# Patient Record
Sex: FEMALE | Race: WHITE | NOT HISPANIC OR LATINO | Employment: FULL TIME | URBAN - METROPOLITAN AREA
[De-identification: names, ages, dates, MRNs, and addresses within clinical notes are randomized per-mention and may not be internally consistent; named-entity substitution may affect disease eponyms.]

---
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
## 2019-02-27 ENCOUNTER — LAB VISIT (OUTPATIENT)
Dept: LAB | Facility: HOSPITAL | Age: 19
End: 2019-02-27
Attending: PSYCHIATRY & NEUROLOGY
Payer: COMMERCIAL

## 2019-02-27 ENCOUNTER — OFFICE VISIT (OUTPATIENT)
Dept: NEUROLOGY | Facility: CLINIC | Age: 19
End: 2019-02-27
Payer: COMMERCIAL

## 2019-02-27 VITALS
HEART RATE: 66 BPM | BODY MASS INDEX: 24.56 KG/M2 | DIASTOLIC BLOOD PRESSURE: 58 MMHG | WEIGHT: 133.5 LBS | RESPIRATION RATE: 20 BRPM | SYSTOLIC BLOOD PRESSURE: 120 MMHG | HEIGHT: 62 IN

## 2019-02-27 DIAGNOSIS — R25.1 TREMOR: Primary | ICD-10-CM

## 2019-02-27 DIAGNOSIS — R25.1 TREMOR: ICD-10-CM

## 2019-02-27 DIAGNOSIS — R20.0 BILATERAL HAND NUMBNESS: ICD-10-CM

## 2019-02-27 DIAGNOSIS — M25.531 PAIN IN BOTH WRISTS: ICD-10-CM

## 2019-02-27 DIAGNOSIS — M25.532 PAIN IN BOTH WRISTS: ICD-10-CM

## 2019-02-27 DIAGNOSIS — F41.9 ANXIETY: ICD-10-CM

## 2019-02-27 LAB
ALBUMIN SERPL BCP-MCNC: 4.3 G/DL
ALP SERPL-CCNC: 66 U/L
ALT SERPL W/O P-5'-P-CCNC: 13 U/L
ANION GAP SERPL CALC-SCNC: 6 MMOL/L
AST SERPL-CCNC: 16 U/L
BASOPHILS # BLD AUTO: 0.06 K/UL
BASOPHILS NFR BLD: 0.7 %
BILIRUB SERPL-MCNC: 0.5 MG/DL
BUN SERPL-MCNC: 9 MG/DL
CALCIUM SERPL-MCNC: 10 MG/DL
CERULOPLASMIN SERPL-MCNC: 20 MG/DL
CHLORIDE SERPL-SCNC: 104 MMOL/L
CO2 SERPL-SCNC: 27 MMOL/L
CREAT SERPL-MCNC: 0.7 MG/DL
DIFFERENTIAL METHOD: ABNORMAL
EOSINOPHIL # BLD AUTO: 0.1 K/UL
EOSINOPHIL NFR BLD: 1.3 %
ERYTHROCYTE [DISTWIDTH] IN BLOOD BY AUTOMATED COUNT: 11.5 %
EST. GFR  (AFRICAN AMERICAN): >60 ML/MIN/1.73 M^2
EST. GFR  (NON AFRICAN AMERICAN): >60 ML/MIN/1.73 M^2
GLUCOSE SERPL-MCNC: 89 MG/DL
HCT VFR BLD AUTO: 40.1 %
HGB BLD-MCNC: 13.6 G/DL
IMM GRANULOCYTES # BLD AUTO: 0.02 K/UL
IMM GRANULOCYTES NFR BLD AUTO: 0.2 %
LYMPHOCYTES # BLD AUTO: 1.8 K/UL
LYMPHOCYTES NFR BLD: 20.1 %
MCH RBC QN AUTO: 33.8 PG
MCHC RBC AUTO-ENTMCNC: 33.9 G/DL
MCV RBC AUTO: 100 FL
MONOCYTES # BLD AUTO: 0.6 K/UL
MONOCYTES NFR BLD: 6.7 %
NEUTROPHILS # BLD AUTO: 6.4 K/UL
NEUTROPHILS NFR BLD: 71 %
NRBC BLD-RTO: 0 /100 WBC
PLATELET # BLD AUTO: 299 K/UL
PMV BLD AUTO: 11.2 FL
POTASSIUM SERPL-SCNC: 4.1 MMOL/L
PROT SERPL-MCNC: 7.3 G/DL
RBC # BLD AUTO: 4.02 M/UL
SODIUM SERPL-SCNC: 137 MMOL/L
T3FREE SERPL-MCNC: 2.6 PG/ML
T4 FREE SERPL-MCNC: 0.92 NG/DL
TSH SERPL DL<=0.005 MIU/L-ACNC: 0.74 UIU/ML
WBC # BLD AUTO: 9.04 K/UL

## 2019-02-27 PROCEDURE — 3008F BODY MASS INDEX DOCD: CPT | Mod: CPTII,S$GLB,, | Performed by: PSYCHIATRY & NEUROLOGY

## 2019-02-27 PROCEDURE — 99205 OFFICE O/P NEW HI 60 MIN: CPT | Mod: S$GLB,,, | Performed by: PSYCHIATRY & NEUROLOGY

## 2019-02-27 PROCEDURE — 80053 COMPREHEN METABOLIC PANEL: CPT

## 2019-02-27 PROCEDURE — 99205 PR OFFICE/OUTPT VISIT, NEW, LEVL V, 60-74 MIN: ICD-10-PCS | Mod: S$GLB,,, | Performed by: PSYCHIATRY & NEUROLOGY

## 2019-02-27 PROCEDURE — 99999 PR PBB SHADOW E&M-EST. PATIENT-LVL III: ICD-10-PCS | Mod: PBBFAC,,, | Performed by: PSYCHIATRY & NEUROLOGY

## 2019-02-27 PROCEDURE — 84481 FREE ASSAY (FT-3): CPT

## 2019-02-27 PROCEDURE — 84439 ASSAY OF FREE THYROXINE: CPT

## 2019-02-27 PROCEDURE — 82390 ASSAY OF CERULOPLASMIN: CPT

## 2019-02-27 PROCEDURE — 99999 PR PBB SHADOW E&M-EST. PATIENT-LVL III: CPT | Mod: PBBFAC,,, | Performed by: PSYCHIATRY & NEUROLOGY

## 2019-02-27 PROCEDURE — 36415 COLL VENOUS BLD VENIPUNCTURE: CPT | Mod: PO

## 2019-02-27 PROCEDURE — 84443 ASSAY THYROID STIM HORMONE: CPT

## 2019-02-27 PROCEDURE — 3008F PR BODY MASS INDEX (BMI) DOCUMENTED: ICD-10-PCS | Mod: CPTII,S$GLB,, | Performed by: PSYCHIATRY & NEUROLOGY

## 2019-02-27 PROCEDURE — 85025 COMPLETE CBC W/AUTO DIFF WBC: CPT

## 2019-02-27 NOTE — PATIENT INSTRUCTIONS
- Wear wrist splints on both wrists nightly for 6-8 weeks  - EMG/nerve conduction study for diagnosis  - Bloodwork to look for other causes of the tremor

## 2019-02-27 NOTE — PROGRESS NOTES
Date: 2/27/2019    Patient ID: Paz Szymanski is a 18 y.o. female.    Referring Provider:  Self, Aaareferral    Chief Complaint: Numbness (bob hands.  Right hand worse); Tremors; and bruning sensation      History of Present Illness:  Ms. Szymanski is a 18 y.o. female who presents referred by Self, Aaareferral today for evaluation of numbness/tingling/burning sensation and tremors. The patient was accompanied by her mother who also contributed to the following history.     She has noticed tremor in her hands for 3 years but it progressively getting worse. This is with activity and not at rest. The right hand is worse but it is in the left hand as well. Her right leg and foot will shake sometimes as well. Sometimes she has jerking of the arm or help.     She has been having shooting pains and burning sensation in the wrists bilateral. That has been going on a long time. This usually happens with activity or use of the hands. She wakes up in the middle of the night with the pain. Shaking the hand improves things. She is constantly cracking her wrists as well because that seems to help. Sometimes it feels numb but in general it is a pain and burning sensation. When she is writing, she has aching pain in her hands.     No numbness in the legs. Her mother stated there is no family history of tremor other than some in her father when he was drinking or smoking. She has not been hungry lately. She has a gluten allergy and lost 60 pounds and gained some back. She has ringing in the ears in both ears. She has occasional cough. She hears herself sometimes wheeze. Her heart races or feels like palpitations sometimes. She feels this is connected to anxiety. She feels like it is well controlled off medication. She wakes up nightly having to change her shirt. This has been going on for a couple of months. She gets very cold or hot and has temperature swings. She has muscle and joint pains.     Review of Systems:   Comprehensive  "review of systems is negative except as mentioned in the HPI    Allergies:  Review of patient's allergies indicates:   Allergen Reactions    Penicillins        Current Medications:  No current outpatient medications on file.     No current facility-administered medications for this visit.        Past Medical History:  Past Medical History:   Diagnosis Date    Thyroid nodule        Past Surgical History:  History reviewed. No pertinent surgical history.    Family History:  family history includes No Known Problems in her mother; Tremor in her father.    Social History:   reports that  has never smoked. she has never used smokeless tobacco. She reports that she drinks alcohol. She reports that she does not use drugs.    Physical Exam:  Vitals:    02/27/19 1107   BP: (!) 120/58   Pulse: 66   Resp: 20   Weight: 60.6 kg (133 lb 8 oz)   Height: 5' 2" (1.575 m)   PainSc: 0-No pain     Body mass index is 24.42 kg/m².  General: Well developed, well nourished.  No acute distress.  Eyes: no ocular hemorrhages  Musculoskeletal: No obvious joint deformities, moves all extremities well.  Peripheral vascular: No edema noted    Neurological Exam:  Mental status: Awake, alert, and oriented to person, place, and time. Recent and remote memory appear to be intact. Attention, concentration, and fund of knowledge regarding recent events normal.   Speech/Language: No dysarthria or aphasia on conversation.   Cranial nerves: Visual fields full. Pupils equal round and reactive to light, extraocular movements intact, facial strength and sensation intact bilaterally, palate and tongue midline, hearing grossly intact bilaterally. Shoulder shrug normal bilaterally.   Motor: 5 out of 5 strength throughout the upper and lower extremities bilaterally. Normal bulk and tone.   Sensation: Intact to light touch, pinprick, and vibration bilaterally.  DTR: 2+ at the knees and biceps bilaterally.  Coordination: Finger-nose-finger testing and rapid " alternating movements normal bilaterally. Bilateral right > left postural and action tremor.   Gait: Normal gait    Data:  I have personally reviewed the referring provider's notes, labs, & imaging made available to me today.       Labs:  CBC:   Lab Results   Component Value Date    WBC 11.53 02/11/2015    HGB 13.4 02/11/2015    HCT 38.8 02/11/2015     02/11/2015    MCV 96 02/11/2015    RDW 12.1 02/11/2015     BMP:   Lab Results   Component Value Date     01/10/2017    K 4.4 01/10/2017     01/10/2017    CO2 24 01/10/2017    BUN 13 01/10/2017    CREATININE 0.8 01/10/2017    GLU 94 01/10/2017    CALCIUM 9.1 01/10/2017     LFTS;   Lab Results   Component Value Date    PROT 7.1 01/10/2017    ALBUMIN 3.8 01/10/2017    BILITOT 0.2 01/10/2017    AST 24 01/10/2017    ALKPHOS 72 01/10/2017    ALT 24 01/10/2017     COAGS: No results found for: INR, PROTIME, PTT  FLP:   Lab Results   Component Value Date    CHOL 193 01/10/2017    HDL 43 01/10/2017    LDLCALC 136.4 01/10/2017    TRIG 68 01/10/2017    CHOLHDL 22.3 01/10/2017         Imaging:  Thyroid US showed nodules but none large enough for biopsy.       Assessment and Plan:  Ms. Szymanski is a 18 y.o. female referred to me by Self, Aaareferral for evaluation of tremor and hand numbness/pain. We will obtain labs to rule out secondary causes of tremor. This appears to be essential tremor or enhanced physiologic tremor. I discussed lifestyle modifications to help. Anxiety can make this worse but she feels her anxiety is well controlled. We discussed propranolol but she is not interested.     For the hand numbness and pain, this sounds like CTS. We will obtain EMG and I advised her to wear wrist splints. If the wrist splints do not help, we can consider ortho referral for injections or CTS surgery discussion. I will see her back after EMG for followup.     Tremor  -     TSH; Future; Expected date: 02/27/2019  -     T4, FREE; Future; Expected date: 02/27/2019  -      CBC auto differential; Future; Expected date: 02/27/2019  -     Comprehensive metabolic panel; Future; Expected date: 02/27/2019  -     EMG W/ ULTRASOUND AND NERVE CONDUCTION TEST 2 Extremities; Future  -     T3, FREE; Future; Expected date: 02/27/2019  -     Ceruloplasmin; Future; Expected date: 02/27/2019    Bilateral hand numbness  -     TSH; Future; Expected date: 02/27/2019  -     T4, FREE; Future; Expected date: 02/27/2019  -     CBC auto differential; Future; Expected date: 02/27/2019  -     Comprehensive metabolic panel; Future; Expected date: 02/27/2019  -     EMG W/ ULTRASOUND AND NERVE CONDUCTION TEST 2 Extremities; Future  -     T3, FREE; Future; Expected date: 02/27/2019  -     Ceruloplasmin; Future; Expected date: 02/27/2019    Anxiety    Pain in both wrists

## 2019-03-04 ENCOUNTER — TELEPHONE (OUTPATIENT)
Dept: NEUROLOGY | Facility: CLINIC | Age: 19
End: 2019-03-04

## 2019-03-04 NOTE — TELEPHONE ENCOUNTER
----- Message from Ren Everett sent at 3/2/2019 11:24 AM CST -----  Contact: self   Patient need to speak with a nurse regarding her brace, she is in a lot of pain and want to know if she should continue wearing at night. Please call back at [phone] (home)

## 2019-03-06 NOTE — TELEPHONE ENCOUNTER
Spoke with patient. She is wearing a [employer] nighttime brace; wearing on both sides since O.V. Stated that the other day was severe but they are feeling a lot better now; thinks she might have had it on too tight.

## 2019-03-12 ENCOUNTER — TELEPHONE (OUTPATIENT)
Dept: NEUROLOGY | Facility: CLINIC | Age: 19
End: 2019-03-12

## 2019-03-12 NOTE — LETTER
March 12, 2019               [facility]  Neurology  [address]  [place] [address]  Phone: [phone]  Fax: [phone]   March 12, 2019     Patient: Paz Szymanski   YOB: 2000   Date of Visit: 3/12/2019       To Whom it May Concern:    Paz Szymanski was seen in my clinic on 3/12/2019. She is currently having difficulty writing due to hand pain so please excuse her from writing until these difficulties improve.     If you have any questions or concerns, please don't hesitate to call.    Sincerely,         Linda Olmos MD

## 2019-03-12 NOTE — TELEPHONE ENCOUNTER
"Pt arrived to clinic without appt requesting drs note for carpal tunnel; pt not wearing brace on right hand; pt is reporting that she got angry at someone "the other day" and punched a wall with right hand. Right hand red and swollen. Dr. Olmos did write a note for today for the pain; however, pt instructed to go to walk in ortho clinic across the street, ED for eval or PCP to assess right hand; pt states "I have no time for this and you just wasted my whole day because I needed this for longer"; notified pt that Dr. Olmos is a neurology specialist and not ortho; pt started crying and walked out.    "

## 2019-03-12 NOTE — TELEPHONE ENCOUNTER
----- Message from Allie Monson sent at 3/12/2019 10:53 AM CDT -----  Contact: self   Patient want to know if you can write a note for her school saying she can't write and the pain she having in her hand patient will  today, any questions please call back at [phone] (home)

## 2019-04-22 ENCOUNTER — PROCEDURE VISIT (OUTPATIENT)
Dept: NEUROLOGY | Facility: CLINIC | Age: 19
End: 2019-04-22
Payer: COMMERCIAL

## 2019-04-22 DIAGNOSIS — R20.0 BILATERAL HAND NUMBNESS: ICD-10-CM

## 2019-04-22 DIAGNOSIS — R25.1 TREMOR: ICD-10-CM

## 2019-04-22 PROCEDURE — 95912 NRV CNDJ TEST 11-12 STUDIES: CPT | Mod: S$GLB,,, | Performed by: PSYCHIATRY & NEUROLOGY

## 2019-04-22 PROCEDURE — 95885 MUSC TST DONE W/NERV TST LIM: CPT | Mod: 59,S$GLB,, | Performed by: PSYCHIATRY & NEUROLOGY

## 2019-04-22 PROCEDURE — 95886 MUSC TEST DONE W/N TEST COMP: CPT | Mod: S$GLB,,, | Performed by: PSYCHIATRY & NEUROLOGY

## 2019-04-22 PROCEDURE — 95912 PR NERVE CONDUCTION STUDY; 11 -12 STUDIES: ICD-10-PCS | Mod: S$GLB,,, | Performed by: PSYCHIATRY & NEUROLOGY

## 2019-04-22 PROCEDURE — 95885 PR MUSC TST DONE W/NERV TST LIM: ICD-10-PCS | Mod: 59,S$GLB,, | Performed by: PSYCHIATRY & NEUROLOGY

## 2019-04-22 PROCEDURE — 95886 PR EMG COMPLETE, W/ NERVE CONDUCTION STUDIES, 5+ MUSCLES: ICD-10-PCS | Mod: S$GLB,,, | Performed by: PSYCHIATRY & NEUROLOGY

## 2019-04-22 NOTE — PROCEDURES
[facility]   [facility]  [address] [place] [address]  [phone]             Full Name: Paz Szymanski Gender: Female  Patient ID: [id number] YOB: 2000      Visit Date: 4/22/2019 13:22  Age: 18 Years 11 Months Old  Examining Physician: Trupti Watkins D.O.   Referring Physician: Linda Olmos M.D.   Height: 5 feet 2 inch  History: Patient complains of chronic pain in her hands since childhood.  Evaluate for carpal tunnel syndrome.      Sensory NCS      Nerve / Sites Rec. Site Onset Lat Peak Lat NP Amp Segments Distance Peak Diff Velocity     ms ms µV  cm ms m/s   L Median - Digit II (Antidromic)      Wrist Dig II 1.98 2.71 50.1 Wrist - Dig II 13  66      Ref.   ?3.30 ?20.0 Ref.   ?50   R Median - Digit II (Antidromic)      Wrist Dig II 1.88 2.60 41.1 Wrist - Dig II 13  69      Ref.   ?3.30 ?20.0 Ref.   ?50   L Ulnar - Digit V (Antidromic)      Wrist Dig V 1.98 2.76 48.9 Wrist - Dig V 11  56      Ref.   ?3.00 ?18.0 Ref.   ?51   R Ulnar - Digit V (Antidromic)      Wrist Dig V 1.72 2.45 42.9 Wrist - Dig V 11  64      Ref.   ?3.00 ?18.0 Ref.   ?51   L Median - Mixed Palmar      Med - Palm Wrist 1.09 1.61 96.2 Med - Palm - Wrist 14  128      Uln - Palm Wrist 1.20 1.72 45.5 Uln - Palm - Wrist 14  117        Med - Palm - Uln - Palm  -0.10         Ref.  ?0.60    Median - Mixed Palmar      Med - Palm Wrist 1.25 1.72 120.3 Med - Palm - Wrist 14  112      Uln - Palm Wrist 1.20 1.72 76.0 Uln - Palm - Wrist 14  117        Med - Palm - Uln - Palm  0.00         Ref.  ?0.60        Motor NCS      Nerve / Sites Muscle Latency Ref. Amplitude Ref. Amp % Duration Segments Distance Lat Diff Ref. Velocity Ref.     ms ms mV mV % ms  cm ms ms m/s m/s   L Median - APB      Wrist APB 3.02 ?3.90 17.0 ?6.0 100 5.36 Wrist - APB 20          Elbow APB 6.41  16.9  99.6 5.47 Elbow - Wrist 20 3.39  59 ?51   R Median - APB      Wrist APB 2.71 ?3.90 10.3 ?6.0 100 5.31 Wrist - APB            Elbow APB 6.30  9.3  90.6 5.52 Elbow - Wrist 21 3.59  58 ?51   L Ulnar - ADM      Wrist ADM 2.34 ?3.00 13.5 ?8.0 100 4.84 Wrist - ADM           B.Elbow ADM 5.10  13.1  97.3 4.95 B.Elbow - Wrist 18 2.76  65 ?51      A.Elbow ADM 6.61  12.4  91.7 5.05 A.Elbow - B.Elbow 10 1.51  66    R Ulnar - ADM      Wrist ADM 2.19 ?3.00 14.9 ?8.0 100 5.26 Wrist - ADM           B.Elbow ADM 5.16  14.3  95.7 5.36 B.Elbow - Wrist 19 2.97  64 ?51      A.Elbow ADM 6.77  14.1  94.7 5.42 A.Elbow - B.Elbow 12 1.61  74    L Median, Ulnar - Lumbrical-Interossei      Median Wrist Lumb II 3.28  4.1  100 5.00 Median Wrist - Lumb II 10          Ulnar Wrist Lumb II 2.60  8.5  207 6.35 Ulnar Wrist - Lumb II 10               Median Wrist - Ulnar Wrist  0.68 ?0.50     R Median, Ulnar - Lumbrical-Interossei      Median Wrist Lumb II 2.55  5.6  100 4.95 Median Wrist - Lumb II 10          Ulnar Wrist Lumb II 2.34  9.7  172 4.90 Ulnar Wrist - Lumb II 10               Median Wrist - Ulnar Wrist  0.21 ?0.50         EMG Summary Table     Spontaneous Recruitment Activation Duration Amplitude Polyphasia Comment   Muscle Ins Act Fib Fasc Pattern - - - - -   L. First dorsal interosseous Normal 0 0 Normal Normal Normal Normal Normal Normal   L. Abductor pollicis brevis Normal 0 0 Normal Normal Normal Normal Normal Normal   L. Pronator teres Normal 0 0 Normal Normal Normal Normal Normal Normal   L. Biceps brachii Normal 0 0 Normal Normal Normal Normal Normal Normal   L. Triceps brachii Normal 0 0 Normal Normal Normal Normal Normal Normal   R. First dorsal interosseous Normal 0 0 Normal Normal Normal Normal Normal Normal   R. Abductor pollicis brevis Normal 0 0 Sl Dec Normal Normal Normal Normal Normal   R. Pronator teres Normal 0 0 Normal Normal Normal Normal Normal Normal   R. Biceps brachii Normal 0 0 Normal Normal Normal Normal Normal Normal   R. Triceps brachii Normal 0 0 Normal Normal Normal Normal Normal Normal         Summary:  Nerve conduction studies  were performed on both upper extremities.  Bilateral median and ulnar sensory responses were normal in amplitude and latency.  Bilateral median and ulnar motor responses were normal in amplitude, latency and velocity.  Due to the question of carpal tunnel syndrome further internal comparison studies were performed.  Bilateral median versus ulnar mixed palmar comparison studies revealed no significant difference in sensory latency between the median and ulnar nerves.  Left median versus ulnar 2nd lumbrical interosseous comparison study revealed a prolonged median motor latency as compared to the ulnar.  The right median versus ulnar 2nd lumbrical interosseous comparison study revealed no significant difference in motor latencies.  Needle EMG was performed on both upper extremities.  No active denervation was present in any muscle tested.  Slightly decreased recruitment was noted in the right abductor pollicis brevis muscle.  All other motor unit morphology and recruitment patterns were normal.    Impression:  This is an essentially normal EMG.  There is evidence that is suggestive, but not diagnostic for median mononeuropathy across the wrist (carpal tunnel syndrome) on the right or left side.  There is no evidence of peripheral neuropathy, cervical radiculopathy, plexopathy, or any other focal neuropathy on this study.      Thank you for referring to the [facility] in [place].  Please feel free to contact the clinic if you have any further questions regarding this study or report.        ____________________________  Trupti Watkins D.O.

## 2019-04-23 ENCOUNTER — TELEPHONE (OUTPATIENT)
Dept: NEUROLOGY | Facility: CLINIC | Age: 19
End: 2019-04-23

## 2019-04-23 DIAGNOSIS — R20.0 BILATERAL HAND NUMBNESS: Primary | ICD-10-CM

## 2019-04-23 DIAGNOSIS — G56.03 BILATERAL CARPAL TUNNEL SYNDROME: ICD-10-CM

## 2019-04-23 NOTE — TELEPHONE ENCOUNTER
----- Message from Yeimy [place] sent at 4/23/2019 11:56 AM CDT -----  Contact: Self  Pt is calling because she says she had an EMG and wants to inform Staff of how she's doing.     She can be reached at [phone].    Thank you.

## 2019-04-23 NOTE — TELEPHONE ENCOUNTER
Pt had an EMG yesterday that showed CTS.  The wrist splints are causing more pain.  What is the next step?

## 2019-05-02 ENCOUNTER — OFFICE VISIT (OUTPATIENT)
Dept: ORTHOPEDICS | Facility: CLINIC | Age: 19
End: 2019-05-02
Payer: COMMERCIAL

## 2019-05-02 ENCOUNTER — HOSPITAL ENCOUNTER (OUTPATIENT)
Dept: RADIOLOGY | Facility: HOSPITAL | Age: 19
Discharge: HOME | End: 2019-05-02
Attending: ORTHOPAEDIC SURGERY
Payer: COMMERCIAL

## 2019-05-02 VITALS
WEIGHT: 133.63 LBS | BODY MASS INDEX: 24.59 KG/M2 | HEIGHT: 62 IN | HEART RATE: 79 BPM | DIASTOLIC BLOOD PRESSURE: 82 MMHG | SYSTOLIC BLOOD PRESSURE: 117 MMHG

## 2019-05-02 DIAGNOSIS — M79.642 PAIN IN BOTH HANDS: ICD-10-CM

## 2019-05-02 DIAGNOSIS — M79.641 PAIN IN BOTH HANDS: ICD-10-CM

## 2019-05-02 DIAGNOSIS — R25.1 TREMOR OF BOTH HANDS: ICD-10-CM

## 2019-05-02 DIAGNOSIS — R25.1 TREMOR OF BOTH HANDS: Primary | ICD-10-CM

## 2019-05-02 DIAGNOSIS — F41.9 ANXIETY: ICD-10-CM

## 2019-05-02 PROCEDURE — 99203 OFFICE O/P NEW LOW 30 MIN: CPT | Mod: S$GLB,,, | Performed by: ORTHOPAEDIC SURGERY

## 2019-05-02 PROCEDURE — 73130 X-RAY EXAM OF HAND: CPT | Mod: TC,PO,RT

## 2019-05-02 PROCEDURE — 73130 XR HAND COMPLETE 3 VIEW RIGHT: ICD-10-PCS | Mod: 26,RT,, | Performed by: RADIOLOGY

## 2019-05-02 PROCEDURE — 3008F BODY MASS INDEX DOCD: CPT | Mod: CPTII,S$GLB,, | Performed by: ORTHOPAEDIC SURGERY

## 2019-05-02 PROCEDURE — 99999 PR PBB SHADOW E&M-EST. PATIENT-LVL IV: ICD-10-PCS | Mod: PBBFAC,,, | Performed by: ORTHOPAEDIC SURGERY

## 2019-05-02 PROCEDURE — 73130 X-RAY EXAM OF HAND: CPT | Mod: 26,RT,, | Performed by: RADIOLOGY

## 2019-05-02 PROCEDURE — 73130 X-RAY EXAM OF HAND: CPT | Mod: TC,PO,LT

## 2019-05-02 PROCEDURE — 99203 PR OFFICE/OUTPT VISIT, NEW, LEVL III, 30-44 MIN: ICD-10-PCS | Mod: S$GLB,,, | Performed by: ORTHOPAEDIC SURGERY

## 2019-05-02 PROCEDURE — 3008F PR BODY MASS INDEX (BMI) DOCUMENTED: ICD-10-PCS | Mod: CPTII,S$GLB,, | Performed by: ORTHOPAEDIC SURGERY

## 2019-05-02 PROCEDURE — 99999 PR PBB SHADOW E&M-EST. PATIENT-LVL IV: CPT | Mod: PBBFAC,,, | Performed by: ORTHOPAEDIC SURGERY

## 2019-05-02 PROCEDURE — 73130 X-RAY EXAM OF HAND: CPT | Mod: 26,LT,, | Performed by: RADIOLOGY

## 2019-05-02 NOTE — LETTER
May 2, 2019      [facility]  [address]  [place] [address]  Phone: [phone]       Patient: Paz Szymanski   YOB: 2000  Date of Visit: 05/02/2019    To Whom It May Concern:    Kelechi Szymanski  was at [facility] on 05/02/2019. She may return to work/school on 5/3/19 with no restrictions. If you have any questions or concerns, or if I can be of further assistance, please do not hesitate to contact me.    Sincerely,    Neal Gottlieb MD

## 2019-05-02 NOTE — LETTER
May 4, 2019      Linda Olmos MD  [address] [place] [address]           [facility]  [address] [place] [address]  Phone: [phone]          Patient: Paz Szymanski   MR Number: [id number]   YOB: 2000   Date of Visit: 5/2/2019       Dear Dr. Linda Olmos:    Thank you for referring Paz Szymanski to me for evaluation. Attached you will find relevant portions of my assessment and plan of care.    If you have questions, please do not hesitate to call me. I look forward to following Paz Szymanski along with you.    Sincerely,    Neal Gottlieb MD    Enclosure  CC:  No Recipients    If you would like to receive this communication electronically, please contact [email or web address] or [phone] to request more information on [employer] access.    For providers and/or their staff who would like to refer a patient to [facility], please contact us through our one-stop-shop provider referral line, [facility], at [phone].    If you feel you have received this communication in error or would no longer like to receive these types of communications, please e-mail [email or web address]

## 2019-05-04 NOTE — H&P
5/4/2019    Chief Complaint:  Chief Complaint   Patient presents with    Right Wrist - Pain    Left Hand - Pain       HPI:  Paz Szymanski is a 18 y.o. female, who presents to clinic today she has a history of bilateral hand numbness as well as tremors.  She also has had a habit of twisting her wrist in causing a clicking sensation.  Her mother states that she does this constantly and seems to have a habitual clicking of her wrist. Patient states that she does occasionally have some numbness in her hands which she does have pain that is associated with this clicking residual.  She has been evaluated by Neurology as well as by primary care. She was sent for a nerve conduction study. She is here today for further evaluation.    PMHX:  Past Medical History:   Diagnosis Date    Thyroid nodule        PSHX:  History reviewed. No pertinent surgical history.    FMHX:  Family History   Problem Relation Age of Onset    No Known Problems Mother     Tremor Father        SOCHX:  Social History     Tobacco Use    Smoking status: Never Smoker    Smokeless tobacco: Never Used   Substance Use Topics    Alcohol use: Yes     Comment: social       ALLERGIES:  Penicillins    CURRENT MEDICATIONS:  No current outpatient medications on file prior to visit.     No current facility-administered medications on file prior to visit.        REVIEW OF SYSTEMS:  Review of Systems   Constitutional: Negative.    HENT: Negative for hearing loss, nosebleeds and sore throat.    Respiratory: Negative for shortness of breath and wheezing.    Cardiovascular: Negative for chest pain and palpitations.   Gastrointestinal: Negative for heartburn, nausea and vomiting.   Genitourinary: Negative for dysuria and urgency.   Skin: Negative.    Neurological: Positive for tremors. Negative for seizures, loss of consciousness and weakness.   Psychiatric/Behavioral: Positive for depression. The patient is nervous/anxious.        GENERAL PHYSICAL EXAM:   /82  "  Pulse 79   Ht 5' 2" (1.575 m)   Wt 60.6 kg (133 lb 9.6 oz)   LMP 04/26/2019 (Exact Date)   BMI 24.44 kg/m²    GEN: well developed, well nourished, no acute distress   HENT: Normocephalic, atraumatic   EYES: No discharge, conjunctiva normal   NECK: Supple, non-tender   PULM: No wheezing, no respiratory distress   CV: RRR   ABD: Soft, non-tender    ORTHO EXAM:   Examination of bilateral hands and wrist reveals that there is no edema.  There are no skin changes.  Palpation produces no areas of tenderness. Range of motion of the wrist bilaterally is extension of 70° and flexion of 70°.  She has full pronation and supination.  She is able make a full composite fist and fully extend all of her fingers.  She has bilateral negative Tinel's and bilateral negative Durkan's test.  She has 5/5 intrinsic and 3 non muscular strength bilaterally.  There is no hyperreflexia over the triceps biceps or brachioradialis.  She has negative Ricci's test.  She has 2+ radial pulses.  Sensation is grossly intact in the median radial and ulnar distributions    EMG/nerve conduction study:   Nerve conduction study has been reviewed.  This is essentially a normal nerve conduction test without evidence of significant nerve compression at the wrist or the elbow.    RADIOLOGY:   X-rays of bilateral hands were taken in clinic today.  She is noted to have no fractures, dislocations, or other pathology    ASSESSMENT:   Bilateral hand tremor, anxiety disorder, bilateral hand pain    PLAN:  1.  I cannot find an organic cause for her pain and therefore I would like to refer her to psychiatry for further evaluation of what appears to be an anxiety disorder and habitual behaviors.  Both the patient and her mother agree that they think this would be a good idea.    2.  She will follow up with me on a p.r.n. basis  "

## 2019-06-03 ENCOUNTER — OFFICE VISIT (OUTPATIENT)
Dept: PSYCHIATRY | Facility: CLINIC | Age: 19
End: 2019-06-03
Payer: COMMERCIAL

## 2019-06-03 VITALS
DIASTOLIC BLOOD PRESSURE: 68 MMHG | WEIGHT: 130.19 LBS | HEIGHT: 62 IN | SYSTOLIC BLOOD PRESSURE: 129 MMHG | BODY MASS INDEX: 23.96 KG/M2 | RESPIRATION RATE: 16 BRPM | HEART RATE: 87 BPM

## 2019-06-03 DIAGNOSIS — F41.1 GAD (GENERALIZED ANXIETY DISORDER): ICD-10-CM

## 2019-06-03 PROCEDURE — 99999 PR PBB SHADOW E&M-EST. PATIENT-LVL III: ICD-10-PCS | Mod: PBBFAC,,, | Performed by: NURSE PRACTITIONER

## 2019-06-03 PROCEDURE — 90792 PSYCH DIAG EVAL W/MED SRVCS: CPT | Mod: S$GLB,,, | Performed by: NURSE PRACTITIONER

## 2019-06-03 PROCEDURE — 99999 PR PBB SHADOW E&M-EST. PATIENT-LVL III: CPT | Mod: PBBFAC,,, | Performed by: NURSE PRACTITIONER

## 2019-06-03 PROCEDURE — 90792 PR PSYCHIATRIC DIAGNOSTIC EVALUATION W/MEDICAL SERVICES: ICD-10-PCS | Mod: S$GLB,,, | Performed by: NURSE PRACTITIONER

## 2019-06-03 RX ORDER — PREDNISONE 20 MG/1
TABLET ORAL
Refills: 0 | COMMUNITY
Start: 2019-06-01 | End: 2019-07-01

## 2019-06-03 RX ORDER — AZELASTINE 1 MG/ML
SPRAY, METERED NASAL
Refills: 0 | COMMUNITY
Start: 2019-06-01 | End: 2019-07-01

## 2019-06-03 RX ORDER — ESCITALOPRAM OXALATE 10 MG/1
TABLET ORAL
Qty: 30 TABLET | Refills: 1 | Status: SHIPPED | OUTPATIENT
Start: 2019-06-03 | End: 2019-07-29 | Stop reason: DRUGHIGH

## 2019-06-03 RX ORDER — PROMETHAZINE HYDROCHLORIDE AND DEXTROMETHORPHAN HYDROBROMIDE 6.25; 15 MG/5ML; MG/5ML
SYRUP ORAL
Refills: 0 | COMMUNITY
Start: 2019-06-01 | End: 2019-07-01

## 2019-06-03 RX ORDER — AZITHROMYCIN 250 MG/1
TABLET, FILM COATED ORAL
Refills: 0 | COMMUNITY
Start: 2019-06-01 | End: 2019-07-01
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
## 2019-07-01 ENCOUNTER — OFFICE VISIT (OUTPATIENT)
Dept: PSYCHIATRY | Facility: CLINIC | Age: 19
End: 2019-07-01
Payer: COMMERCIAL

## 2019-07-01 VITALS
DIASTOLIC BLOOD PRESSURE: 57 MMHG | WEIGHT: 127.44 LBS | SYSTOLIC BLOOD PRESSURE: 106 MMHG | BODY MASS INDEX: 23.45 KG/M2 | RESPIRATION RATE: 16 BRPM | HEIGHT: 62 IN | HEART RATE: 61 BPM

## 2019-07-01 DIAGNOSIS — R25.1 TREMOR OF BOTH HANDS: ICD-10-CM

## 2019-07-01 DIAGNOSIS — F41.1 GAD (GENERALIZED ANXIETY DISORDER): Primary | ICD-10-CM

## 2019-07-01 PROCEDURE — 99999 PR PBB SHADOW E&M-EST. PATIENT-LVL III: ICD-10-PCS | Mod: PBBFAC,,, | Performed by: NURSE PRACTITIONER

## 2019-07-01 PROCEDURE — 3008F BODY MASS INDEX DOCD: CPT | Mod: CPTII,S$GLB,, | Performed by: NURSE PRACTITIONER

## 2019-07-01 PROCEDURE — 99214 OFFICE O/P EST MOD 30 MIN: CPT | Mod: S$GLB,,, | Performed by: NURSE PRACTITIONER

## 2019-07-01 PROCEDURE — 90833 PR PSYCHOTHERAPY W/PATIENT W/E&M, 30 MIN (ADD ON): ICD-10-PCS | Mod: S$GLB,,, | Performed by: NURSE PRACTITIONER

## 2019-07-01 PROCEDURE — 99214 PR OFFICE/OUTPT VISIT, EST, LEVL IV, 30-39 MIN: ICD-10-PCS | Mod: S$GLB,,, | Performed by: NURSE PRACTITIONER

## 2019-07-01 PROCEDURE — 3008F PR BODY MASS INDEX (BMI) DOCUMENTED: ICD-10-PCS | Mod: CPTII,S$GLB,, | Performed by: NURSE PRACTITIONER

## 2019-07-01 PROCEDURE — 99999 PR PBB SHADOW E&M-EST. PATIENT-LVL III: CPT | Mod: PBBFAC,,, | Performed by: NURSE PRACTITIONER

## 2019-07-01 PROCEDURE — 90833 PSYTX W PT W E/M 30 MIN: CPT | Mod: S$GLB,,, | Performed by: NURSE PRACTITIONER

## 2019-07-01 RX ORDER — HYDROXYZINE PAMOATE 25 MG/1
CAPSULE ORAL
Qty: 30 CAPSULE | Refills: 0 | Status: SHIPPED | OUTPATIENT
Start: 2019-07-01 | End: 2020-05-26
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
## 2019-07-29 ENCOUNTER — OFFICE VISIT (OUTPATIENT)
Dept: PSYCHIATRY | Facility: CLINIC | Age: 19
End: 2019-07-29
Payer: COMMERCIAL

## 2019-07-29 VITALS
BODY MASS INDEX: 24.14 KG/M2 | SYSTOLIC BLOOD PRESSURE: 110 MMHG | HEIGHT: 62 IN | WEIGHT: 131.19 LBS | HEART RATE: 52 BPM | DIASTOLIC BLOOD PRESSURE: 59 MMHG | RESPIRATION RATE: 16 BRPM

## 2019-07-29 DIAGNOSIS — F41.1 GAD (GENERALIZED ANXIETY DISORDER): Primary | ICD-10-CM

## 2019-07-29 PROCEDURE — 99999 PR PBB SHADOW E&M-EST. PATIENT-LVL III: CPT | Mod: PBBFAC,,, | Performed by: NURSE PRACTITIONER

## 2019-07-29 PROCEDURE — 3008F PR BODY MASS INDEX (BMI) DOCUMENTED: ICD-10-PCS | Mod: CPTII,S$GLB,, | Performed by: NURSE PRACTITIONER

## 2019-07-29 PROCEDURE — 90833 PR PSYCHOTHERAPY W/PATIENT W/E&M, 30 MIN (ADD ON): ICD-10-PCS | Mod: S$GLB,,, | Performed by: NURSE PRACTITIONER

## 2019-07-29 PROCEDURE — 99213 PR OFFICE/OUTPT VISIT, EST, LEVL III, 20-29 MIN: ICD-10-PCS | Mod: S$GLB,,, | Performed by: NURSE PRACTITIONER

## 2019-07-29 PROCEDURE — 99213 OFFICE O/P EST LOW 20 MIN: CPT | Mod: S$GLB,,, | Performed by: NURSE PRACTITIONER

## 2019-07-29 PROCEDURE — 3008F BODY MASS INDEX DOCD: CPT | Mod: CPTII,S$GLB,, | Performed by: NURSE PRACTITIONER

## 2019-07-29 PROCEDURE — 99999 PR PBB SHADOW E&M-EST. PATIENT-LVL III: ICD-10-PCS | Mod: PBBFAC,,, | Performed by: NURSE PRACTITIONER

## 2019-07-29 PROCEDURE — 90833 PSYTX W PT W E/M 30 MIN: CPT | Mod: S$GLB,,, | Performed by: NURSE PRACTITIONER

## 2019-07-29 RX ORDER — ESCITALOPRAM OXALATE 20 MG/1
20 TABLET ORAL DAILY
Qty: 30 TABLET | Refills: 1 | Status: SHIPPED | OUTPATIENT
Start: 2019-07-29 | End: 2019-09-23 | Stop reason: SDUPTHER
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
## 2019-08-27 ENCOUNTER — OFFICE VISIT (OUTPATIENT)
Dept: PSYCHIATRY | Facility: CLINIC | Age: 19
End: 2019-08-27
Payer: COMMERCIAL

## 2019-08-27 VITALS
HEART RATE: 64 BPM | RESPIRATION RATE: 16 BRPM | SYSTOLIC BLOOD PRESSURE: 122 MMHG | WEIGHT: 136.13 LBS | HEIGHT: 62 IN | DIASTOLIC BLOOD PRESSURE: 64 MMHG | BODY MASS INDEX: 25.05 KG/M2

## 2019-08-27 DIAGNOSIS — R25.1 TREMOR OF BOTH HANDS: ICD-10-CM

## 2019-08-27 DIAGNOSIS — F41.1 GAD (GENERALIZED ANXIETY DISORDER): Primary | ICD-10-CM

## 2019-08-27 PROCEDURE — 99999 PR PBB SHADOW E&M-EST. PATIENT-LVL III: ICD-10-PCS | Mod: PBBFAC,,, | Performed by: NURSE PRACTITIONER

## 2019-08-27 PROCEDURE — 90833 PR PSYCHOTHERAPY W/PATIENT W/E&M, 30 MIN (ADD ON): ICD-10-PCS | Mod: S$GLB,,, | Performed by: NURSE PRACTITIONER

## 2019-08-27 PROCEDURE — 99214 PR OFFICE/OUTPT VISIT, EST, LEVL IV, 30-39 MIN: ICD-10-PCS | Mod: S$GLB,,, | Performed by: NURSE PRACTITIONER

## 2019-08-27 PROCEDURE — 99214 OFFICE O/P EST MOD 30 MIN: CPT | Mod: S$GLB,,, | Performed by: NURSE PRACTITIONER

## 2019-08-27 PROCEDURE — 3008F BODY MASS INDEX DOCD: CPT | Mod: CPTII,S$GLB,, | Performed by: NURSE PRACTITIONER

## 2019-08-27 PROCEDURE — 3008F PR BODY MASS INDEX (BMI) DOCUMENTED: ICD-10-PCS | Mod: CPTII,S$GLB,, | Performed by: NURSE PRACTITIONER

## 2019-08-27 PROCEDURE — 90833 PSYTX W PT W E/M 30 MIN: CPT | Mod: S$GLB,,, | Performed by: NURSE PRACTITIONER

## 2019-08-27 PROCEDURE — 99999 PR PBB SHADOW E&M-EST. PATIENT-LVL III: CPT | Mod: PBBFAC,,, | Performed by: NURSE PRACTITIONER

## 2019-08-27 RX ORDER — TRAZODONE HYDROCHLORIDE 50 MG/1
TABLET ORAL
Qty: 15 TABLET | Refills: 0 | Status: SHIPPED | OUTPATIENT
Start: 2019-08-27 | End: 2019-09-11 | Stop reason: SDUPTHER
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
## 2019-09-09 ENCOUNTER — TELEPHONE (OUTPATIENT)
Dept: PSYCHIATRY | Facility: CLINIC | Age: 19
End: 2019-09-09
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
## 2019-09-11 ENCOUNTER — OFFICE VISIT (OUTPATIENT)
Dept: PSYCHIATRY | Facility: CLINIC | Age: 19
End: 2019-09-11
Payer: COMMERCIAL

## 2019-09-11 VITALS
BODY MASS INDEX: 25.87 KG/M2 | RESPIRATION RATE: 16 BRPM | SYSTOLIC BLOOD PRESSURE: 109 MMHG | HEART RATE: 67 BPM | DIASTOLIC BLOOD PRESSURE: 64 MMHG | HEIGHT: 62 IN | WEIGHT: 140.56 LBS

## 2019-09-11 DIAGNOSIS — F41.1 GAD (GENERALIZED ANXIETY DISORDER): Primary | ICD-10-CM

## 2019-09-11 PROCEDURE — 99999 PR PBB SHADOW E&M-EST. PATIENT-LVL III: CPT | Mod: PBBFAC,,, | Performed by: NURSE PRACTITIONER

## 2019-09-11 PROCEDURE — 99999 PR PBB SHADOW E&M-EST. PATIENT-LVL III: ICD-10-PCS | Mod: PBBFAC,,, | Performed by: NURSE PRACTITIONER

## 2019-09-11 PROCEDURE — 3008F PR BODY MASS INDEX (BMI) DOCUMENTED: ICD-10-PCS | Mod: CPTII,S$GLB,, | Performed by: NURSE PRACTITIONER

## 2019-09-11 PROCEDURE — 99214 PR OFFICE/OUTPT VISIT, EST, LEVL IV, 30-39 MIN: ICD-10-PCS | Mod: S$GLB,,, | Performed by: NURSE PRACTITIONER

## 2019-09-11 PROCEDURE — 90833 PR PSYCHOTHERAPY W/PATIENT W/E&M, 30 MIN (ADD ON): ICD-10-PCS | Mod: S$GLB,,, | Performed by: NURSE PRACTITIONER

## 2019-09-11 PROCEDURE — 90833 PSYTX W PT W E/M 30 MIN: CPT | Mod: S$GLB,,, | Performed by: NURSE PRACTITIONER

## 2019-09-11 PROCEDURE — 99214 OFFICE O/P EST MOD 30 MIN: CPT | Mod: S$GLB,,, | Performed by: NURSE PRACTITIONER

## 2019-09-11 PROCEDURE — 3008F BODY MASS INDEX DOCD: CPT | Mod: CPTII,S$GLB,, | Performed by: NURSE PRACTITIONER

## 2019-09-11 RX ORDER — TRAZODONE HYDROCHLORIDE 50 MG/1
50 TABLET ORAL NIGHTLY
Qty: 30 TABLET | Refills: 2 | Status: SHIPPED | OUTPATIENT
Start: 2019-09-11 | End: 2020-05-26 | Stop reason: SDUPTHER
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
## 2019-09-23 RX ORDER — ESCITALOPRAM OXALATE 20 MG/1
TABLET ORAL
Qty: 30 TABLET | Refills: 2 | Status: SHIPPED | OUTPATIENT
Start: 2019-09-23 | End: 2020-02-06

## 2019-10-21 ENCOUNTER — TELEPHONE (OUTPATIENT)
Dept: PSYCHIATRY | Facility: CLINIC | Age: 19
End: 2019-10-21

## 2019-10-21 NOTE — TELEPHONE ENCOUNTER
Pt left voicemail to reschedule her appt.  States she is coming from [place] and the weather is bad, she is requesting to reschedule appt.      Returned pt call and appt rescheduled.

## 2019-10-22 ENCOUNTER — OFFICE VISIT (OUTPATIENT)
Dept: PSYCHIATRY | Facility: CLINIC | Age: 19
End: 2019-10-22
Payer: COMMERCIAL

## 2019-10-22 VITALS
HEIGHT: 62 IN | WEIGHT: 146.63 LBS | BODY MASS INDEX: 26.98 KG/M2 | HEART RATE: 69 BPM | SYSTOLIC BLOOD PRESSURE: 107 MMHG | DIASTOLIC BLOOD PRESSURE: 73 MMHG

## 2019-10-22 DIAGNOSIS — F43.21 ADJUSTMENT DISORDER WITH DEPRESSED MOOD: ICD-10-CM

## 2019-10-22 PROCEDURE — 99999 PR PBB SHADOW E&M-EST. PATIENT-LVL III: ICD-10-PCS | Mod: PBBFAC,,, | Performed by: NURSE PRACTITIONER

## 2019-10-22 PROCEDURE — 3008F BODY MASS INDEX DOCD: CPT | Mod: CPTII,S$GLB,, | Performed by: NURSE PRACTITIONER

## 2019-10-22 PROCEDURE — 99214 PR OFFICE/OUTPT VISIT, EST, LEVL IV, 30-39 MIN: ICD-10-PCS | Mod: S$GLB,,, | Performed by: NURSE PRACTITIONER

## 2019-10-22 PROCEDURE — 99999 PR PBB SHADOW E&M-EST. PATIENT-LVL III: CPT | Mod: PBBFAC,,, | Performed by: NURSE PRACTITIONER

## 2019-10-22 PROCEDURE — 90833 PSYTX W PT W E/M 30 MIN: CPT | Mod: S$GLB,,, | Performed by: NURSE PRACTITIONER

## 2019-10-22 PROCEDURE — 90833 PR PSYCHOTHERAPY W/PATIENT W/E&M, 30 MIN (ADD ON): ICD-10-PCS | Mod: S$GLB,,, | Performed by: NURSE PRACTITIONER

## 2019-10-22 PROCEDURE — 3008F PR BODY MASS INDEX (BMI) DOCUMENTED: ICD-10-PCS | Mod: CPTII,S$GLB,, | Performed by: NURSE PRACTITIONER

## 2019-10-22 PROCEDURE — 99214 OFFICE O/P EST MOD 30 MIN: CPT | Mod: S$GLB,,, | Performed by: NURSE PRACTITIONER
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
## 2020-01-03 ENCOUNTER — TELEPHONE (OUTPATIENT)
Dept: PSYCHIATRY | Facility: CLINIC | Age: 20
End: 2020-01-03

## 2020-01-06 ENCOUNTER — TELEPHONE (OUTPATIENT)
Dept: PSYCHIATRY | Facility: CLINIC | Age: 20
End: 2020-01-06

## 2020-02-06 ENCOUNTER — TELEPHONE (OUTPATIENT)
Dept: PSYCHIATRY | Facility: CLINIC | Age: 20
End: 2020-02-06

## 2020-02-06 RX ORDER — ESCITALOPRAM OXALATE 20 MG/1
TABLET ORAL
Qty: 30 TABLET | Refills: 2 | OUTPATIENT
Start: 2020-02-06

## 2020-02-06 RX ORDER — ESCITALOPRAM OXALATE 10 MG/1
10 TABLET ORAL DAILY
Qty: 30 TABLET | Refills: 3 | Status: SHIPPED | OUTPATIENT
Start: 2020-02-06 | End: 2020-04-14 | Stop reason: ALTCHOICE

## 2020-02-06 NOTE — TELEPHONE ENCOUNTER
"Pt called to reschedule follow up appt and to request a refill on her Lexapro.  However, pt states she has been taking one-half tab (10mg) as the 20mg she felt were making her "lazy".  Pt states she is doing well on the 10mg and is requesting a new prescription be sent in for the lexapro 10mg tablets.  Follow up scheduled for 02/11/2020.   "

## 2020-02-11 ENCOUNTER — OFFICE VISIT (OUTPATIENT)
Dept: PSYCHIATRY | Facility: CLINIC | Age: 20
End: 2020-02-11
Payer: COMMERCIAL

## 2020-02-11 VITALS
DIASTOLIC BLOOD PRESSURE: 79 MMHG | SYSTOLIC BLOOD PRESSURE: 119 MMHG | HEIGHT: 62 IN | WEIGHT: 159.06 LBS | HEART RATE: 64 BPM | BODY MASS INDEX: 29.27 KG/M2

## 2020-02-11 DIAGNOSIS — F98.8 ATTENTION DEFICIT DISORDER, UNSPECIFIED HYPERACTIVITY PRESENCE: ICD-10-CM

## 2020-02-11 DIAGNOSIS — F43.21 ADJUSTMENT DISORDER WITH DEPRESSED MOOD: ICD-10-CM

## 2020-02-11 DIAGNOSIS — F41.1 GAD (GENERALIZED ANXIETY DISORDER): Primary | ICD-10-CM

## 2020-02-11 PROCEDURE — 99214 OFFICE O/P EST MOD 30 MIN: CPT | Mod: S$GLB,,, | Performed by: NURSE PRACTITIONER

## 2020-02-11 PROCEDURE — 90833 PR PSYCHOTHERAPY W/PATIENT W/E&M, 30 MIN (ADD ON): ICD-10-PCS | Mod: S$GLB,,, | Performed by: NURSE PRACTITIONER

## 2020-02-11 PROCEDURE — 3008F PR BODY MASS INDEX (BMI) DOCUMENTED: ICD-10-PCS | Mod: CPTII,S$GLB,, | Performed by: NURSE PRACTITIONER

## 2020-02-11 PROCEDURE — 99214 PR OFFICE/OUTPT VISIT, EST, LEVL IV, 30-39 MIN: ICD-10-PCS | Mod: S$GLB,,, | Performed by: NURSE PRACTITIONER

## 2020-02-11 PROCEDURE — 99999 PR PBB SHADOW E&M-EST. PATIENT-LVL III: ICD-10-PCS | Mod: PBBFAC,,, | Performed by: NURSE PRACTITIONER

## 2020-02-11 PROCEDURE — 3008F BODY MASS INDEX DOCD: CPT | Mod: CPTII,S$GLB,, | Performed by: NURSE PRACTITIONER

## 2020-02-11 PROCEDURE — 90833 PSYTX W PT W E/M 30 MIN: CPT | Mod: S$GLB,,, | Performed by: NURSE PRACTITIONER

## 2020-02-11 PROCEDURE — 99999 PR PBB SHADOW E&M-EST. PATIENT-LVL III: CPT | Mod: PBBFAC,,, | Performed by: NURSE PRACTITIONER
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
## 2020-04-13 ENCOUNTER — TELEPHONE (OUTPATIENT)
Dept: PSYCHIATRY | Facility: CLINIC | Age: 20
End: 2020-04-13

## 2020-04-13 NOTE — TELEPHONE ENCOUNTER
"This nurse contacted pt to convert appointment to virtual visit.  Pt states "I dont have access to mychart" " I dont like this at all, youre going to call me the day before my appointment to tell me all of this?" " Nan been trying to get off this lexapro for 5 months now."  Can you just tell me how to get off of it and I'll go somewhere else?"    This nurse apologized and advised pt that we are doing our best to contact our patients to convert these appointments in a timely manner as well as continue to take the best possible care of patients as some staff have been deployed to the hospitals to assist with covid 19 pandemic.     Pt states "I just dont like this." This nurse advised pt that I will contact provider and obtain titration instructions and return her call.  Please advise.   "

## 2020-04-14 ENCOUNTER — OFFICE VISIT (OUTPATIENT)
Dept: PSYCHIATRY | Facility: CLINIC | Age: 20
End: 2020-04-14
Payer: COMMERCIAL

## 2020-04-14 DIAGNOSIS — F41.1 GAD (GENERALIZED ANXIETY DISORDER): Primary | ICD-10-CM

## 2020-04-14 DIAGNOSIS — F43.21 ADJUSTMENT DISORDER WITH DEPRESSED MOOD: ICD-10-CM

## 2020-04-14 PROCEDURE — 99213 PR OFFICE/OUTPT VISIT, EST, LEVL III, 20-29 MIN: ICD-10-PCS | Mod: 95,,, | Performed by: NURSE PRACTITIONER

## 2020-04-14 PROCEDURE — 99213 OFFICE O/P EST LOW 20 MIN: CPT | Mod: 95,,, | Performed by: NURSE PRACTITIONER

## 2020-04-14 RX ORDER — SERTRALINE HYDROCHLORIDE 25 MG/1
25 TABLET, FILM COATED ORAL DAILY
Qty: 30 TABLET | Refills: 1 | Status: SHIPPED | OUTPATIENT
Start: 2020-04-14 | End: 2020-05-26
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
## 2020-05-05 ENCOUNTER — OFFICE VISIT (OUTPATIENT)
Dept: PSYCHIATRY | Facility: CLINIC | Age: 20
End: 2020-05-05
Payer: COMMERCIAL

## 2020-05-05 DIAGNOSIS — F41.1 GAD (GENERALIZED ANXIETY DISORDER): Primary | ICD-10-CM

## 2020-05-05 DIAGNOSIS — F43.21 ADJUSTMENT DISORDER WITH DEPRESSED MOOD: ICD-10-CM

## 2020-05-05 PROCEDURE — 90791 PSYCH DIAGNOSTIC EVALUATION: CPT | Mod: S$GLB,,, | Performed by: SOCIAL WORKER

## 2020-05-05 PROCEDURE — 90791 PR PSYCHIATRIC DIAGNOSTIC EVALUATION: ICD-10-PCS | Mod: S$GLB,,, | Performed by: SOCIAL WORKER
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
## 2020-05-20 ENCOUNTER — OFFICE VISIT (OUTPATIENT)
Dept: PSYCHIATRY | Facility: CLINIC | Age: 20
End: 2020-05-20
Payer: COMMERCIAL

## 2020-05-20 DIAGNOSIS — F41.1 GAD (GENERALIZED ANXIETY DISORDER): Primary | ICD-10-CM

## 2020-05-20 DIAGNOSIS — F43.21 ADJUSTMENT DISORDER WITH DEPRESSED MOOD: ICD-10-CM

## 2020-05-20 DIAGNOSIS — F98.8 ATTENTION DEFICIT DISORDER, UNSPECIFIED HYPERACTIVITY PRESENCE: ICD-10-CM

## 2020-05-20 PROCEDURE — 90834 PR PSYCHOTHERAPY W/PATIENT, 45 MIN: ICD-10-PCS | Mod: S$GLB,,, | Performed by: SOCIAL WORKER

## 2020-05-20 PROCEDURE — 90834 PSYTX W PT 45 MINUTES: CPT | Mod: S$GLB,,, | Performed by: SOCIAL WORKER

## 2020-05-20 PROCEDURE — 99999 PR PBB SHADOW E&M-EST. PATIENT-LVL I: CPT | Mod: PBBFAC,,, | Performed by: SOCIAL WORKER

## 2020-05-20 PROCEDURE — 99999 PR PBB SHADOW E&M-EST. PATIENT-LVL I: ICD-10-PCS | Mod: PBBFAC,,, | Performed by: SOCIAL WORKER
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
## 2020-05-26 ENCOUNTER — OFFICE VISIT (OUTPATIENT)
Dept: PSYCHIATRY | Facility: CLINIC | Age: 20
End: 2020-05-26
Payer: COMMERCIAL

## 2020-05-26 VITALS
HEIGHT: 62 IN | HEART RATE: 57 BPM | TEMPERATURE: 98 F | WEIGHT: 167.13 LBS | SYSTOLIC BLOOD PRESSURE: 115 MMHG | BODY MASS INDEX: 30.76 KG/M2 | DIASTOLIC BLOOD PRESSURE: 77 MMHG | RESPIRATION RATE: 16 BRPM

## 2020-05-26 DIAGNOSIS — F43.21 ADJUSTMENT DISORDER WITH DEPRESSED MOOD: ICD-10-CM

## 2020-05-26 DIAGNOSIS — F42.9 OBSESSIVE-COMPULSIVE DISORDER, UNSPECIFIED TYPE: ICD-10-CM

## 2020-05-26 DIAGNOSIS — F41.1 GAD (GENERALIZED ANXIETY DISORDER): Primary | ICD-10-CM

## 2020-05-26 PROCEDURE — 90833 PR PSYCHOTHERAPY W/PATIENT W/E&M, 30 MIN (ADD ON): ICD-10-PCS | Mod: S$GLB,,, | Performed by: NURSE PRACTITIONER

## 2020-05-26 PROCEDURE — 90833 PSYTX W PT W E/M 30 MIN: CPT | Mod: S$GLB,,, | Performed by: NURSE PRACTITIONER

## 2020-05-26 PROCEDURE — 99214 PR OFFICE/OUTPT VISIT, EST, LEVL IV, 30-39 MIN: ICD-10-PCS | Mod: S$GLB,,, | Performed by: NURSE PRACTITIONER

## 2020-05-26 PROCEDURE — 99999 PR PBB SHADOW E&M-EST. PATIENT-LVL III: ICD-10-PCS | Mod: PBBFAC,,, | Performed by: NURSE PRACTITIONER

## 2020-05-26 PROCEDURE — 99214 OFFICE O/P EST MOD 30 MIN: CPT | Mod: S$GLB,,, | Performed by: NURSE PRACTITIONER

## 2020-05-26 PROCEDURE — 3008F BODY MASS INDEX DOCD: CPT | Mod: CPTII,S$GLB,, | Performed by: NURSE PRACTITIONER

## 2020-05-26 PROCEDURE — 3008F PR BODY MASS INDEX (BMI) DOCUMENTED: ICD-10-PCS | Mod: CPTII,S$GLB,, | Performed by: NURSE PRACTITIONER

## 2020-05-26 PROCEDURE — 99999 PR PBB SHADOW E&M-EST. PATIENT-LVL III: CPT | Mod: PBBFAC,,, | Performed by: NURSE PRACTITIONER

## 2020-05-26 RX ORDER — SERTRALINE HYDROCHLORIDE 50 MG/1
50 TABLET, FILM COATED ORAL DAILY
Qty: 30 TABLET | Refills: 1 | Status: SHIPPED | OUTPATIENT
Start: 2020-05-26 | End: 2020-08-04

## 2020-05-26 RX ORDER — TRETINOIN 0.5 MG/G
CREAM TOPICAL
COMMUNITY
Start: 2020-05-18 | End: 2021-04-23 | Stop reason: ALTCHOICE

## 2020-05-26 RX ORDER — TRAZODONE HYDROCHLORIDE 50 MG/1
50 TABLET ORAL NIGHTLY
Qty: 30 TABLET | Refills: 2 | Status: SHIPPED | OUTPATIENT
Start: 2020-05-26 | End: 2020-11-19 | Stop reason: SDUPTHER
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
## 2020-06-10 ENCOUNTER — OFFICE VISIT (OUTPATIENT)
Dept: PSYCHIATRY | Facility: CLINIC | Age: 20
End: 2020-06-10
Payer: COMMERCIAL

## 2020-06-10 DIAGNOSIS — F43.21 ADJUSTMENT DISORDER WITH DEPRESSED MOOD: ICD-10-CM

## 2020-06-10 DIAGNOSIS — F41.1 GAD (GENERALIZED ANXIETY DISORDER): Primary | ICD-10-CM

## 2020-06-10 DIAGNOSIS — F42.9 OBSESSIVE-COMPULSIVE DISORDER, UNSPECIFIED TYPE: ICD-10-CM

## 2020-06-10 PROCEDURE — 90834 PSYTX W PT 45 MINUTES: CPT | Mod: S$GLB,,, | Performed by: SOCIAL WORKER

## 2020-06-10 PROCEDURE — 90834 PR PSYCHOTHERAPY W/PATIENT, 45 MIN: ICD-10-PCS | Mod: S$GLB,,, | Performed by: SOCIAL WORKER

## 2020-06-10 NOTE — PROGRESS NOTES
"Individual Psychotherapy (PhD/LCSW)    6/10/2020    Site:  [facility]         Therapeutic Intervention: Met with patient.  Outpatient - Insight oriented psychotherapy 45 min - CPT code 40209, Outpatient - Behavior modifying psychotherapy 45 min - CPT code 25730 and Outpatient - Supportive psychotherapy 45 min - CPT Code 65313    Chief complaint/reason for encounter: depression and anxiety     Interval history and content of current session:  Pt euthymic, reports mood is "good actually, much better". Had a few difficult customers to deal with and did well. Feels relieved re no longer being involved with a friend that was toxic. Pt has other friends and enjoys spending time with them. She has not talked to mom and has kept healthy boundaries with her. Pt trying to figure out what to do next re career and /or school. Pt feels less depressed, less anxious, more hopeful.  She does have questions sometimes re "who I'm attracted to, will explore more at next session , as this was brought up at end of session.   Focused on supportive and insight oriented therapy, validation, CBT, use of healthy coping sklls and family dynamics. Pt denies any current SI/HI. Denies any current A/VH.       Treatment plan:  · Target symptoms: depression, anxiety   · Why chosen therapy is appropriate versus another modality: relevant to diagnosis, patient responds to this modality, evidence based practice  · Outcome monitoring methods: self-report, observation  · Therapeutic intervention type: insight oriented psychotherapy, behavior modifying psychotherapy, supportive psychotherapy    Risk parameters:  Patient reports no suicidal ideation  Patient reports no homicidal ideation  Patient reports no self-injurious behavior  Patient reports no violent behavior    Verbal deficits: None    Patient's response to intervention:  The patient's response to intervention is accepting.    Progress toward goals and other mental status changes:  The " patient's progress toward goals is fair .    Diagnosis:     ICD-10-CM ICD-9-CM   1. ANANYA (generalized anxiety disorder) F41.1 300.02   2. Adjustment disorder with depressed mood F43.21 309.0   3. Obsessive-compulsive disorder, unspecified type F42.9 300.3   Treatment Goals:  Specify outcomes written in observable, behavioral terms:   Anxiety: reducing physical symptoms of anxiety and reducing time spent worrying (<30 minutes/day)  Depression: increasing interest in usual activities and reducing negative automatic thoughts    Treatment Plan/Recommendations:   · The treatment plan and follow up plan were reviewed with the patient.    Plan:  individual psychotherapy and continue medication management with [facility] psychiatry    Return to clinic: 2 weeks, earlier if needed. Pt to go to ED or call 911 if symptoms worsen or if she has thoughts of harming self and /or others. Pt verbalized understanding.       Length of Service (minutes): 45

## 2020-07-01 ENCOUNTER — OFFICE VISIT (OUTPATIENT)
Dept: PSYCHIATRY | Facility: CLINIC | Age: 20
End: 2020-07-01
Payer: COMMERCIAL

## 2020-07-01 DIAGNOSIS — F41.1 GAD (GENERALIZED ANXIETY DISORDER): Primary | ICD-10-CM

## 2020-07-01 DIAGNOSIS — F98.8 ATTENTION DEFICIT DISORDER, UNSPECIFIED HYPERACTIVITY PRESENCE: ICD-10-CM

## 2020-07-01 DIAGNOSIS — F42.9 OBSESSIVE-COMPULSIVE DISORDER, UNSPECIFIED TYPE: ICD-10-CM

## 2020-07-01 DIAGNOSIS — F43.21 ADJUSTMENT DISORDER WITH DEPRESSED MOOD: ICD-10-CM

## 2020-07-01 PROCEDURE — 90834 PR PSYCHOTHERAPY W/PATIENT, 45 MIN: ICD-10-PCS | Mod: S$GLB,,, | Performed by: SOCIAL WORKER

## 2020-07-01 PROCEDURE — 90834 PSYTX W PT 45 MINUTES: CPT | Mod: S$GLB,,, | Performed by: SOCIAL WORKER

## 2020-07-01 NOTE — PROGRESS NOTES
"Individual Psychotherapy (PhD/LCSW)    7/1/2020    Site:  [facility]         Therapeutic Intervention: Met with patient.  Outpatient - Insight oriented psychotherapy 45 min - CPT code 25666, Outpatient - Behavior modifying psychotherapy 45 min - CPT code 37315 and Outpatient - Supportive psychotherapy 45 min - CPT Code 70856    Chief complaint/reason for encounter: depression and anxiety     Interval history and content of current session:  Pt euthymic, reports mood is "good ", does notice some agitation with others, but trying to think about it before she says something or reacts. Went to beach with friends and enjoyed it. Also handled a stressful situation well. Is maintaining boundaries with mother and notices it helps pt mood.    Focused on supportive and insight oriented therapy, validation, CBT, use of healthy coping sklls and family dynamics. Pt denies any current SI/HI. Denies any current A/VH. Pt wants to pursue what  "big girl job" she wants to do. No longer wants to be a . Will explore at next appt.      Treatment plan:  · Target symptoms: depression, anxiety   · Why chosen therapy is appropriate versus another modality: relevant to diagnosis, patient responds to this modality, evidence based practice  · Outcome monitoring methods: self-report, observation  · Therapeutic intervention type: insight oriented psychotherapy, behavior modifying psychotherapy, supportive psychotherapy    Risk parameters:  Patient reports no suicidal ideation  Patient reports no homicidal ideation  Patient reports no self-injurious behavior  Patient reports no violent behavior    Verbal deficits: None    Patient's response to intervention:  The patient's response to intervention is accepting.    Progress toward goals and other mental status changes:  The patient's progress toward goals is fair .    Diagnosis:     ICD-10-CM ICD-9-CM   1. ANANYA (generalized anxiety disorder)  F41.1 300.02   2. Adjustment " disorder with depressed mood  F43.21 309.0   3. Obsessive-compulsive disorder, unspecified type  F42.9 300.3   4. Attention deficit disorder, unspecified hyperactivity presence  F98.8 314.00   Treatment Goals:  Specify outcomes written in observable, behavioral terms:   Anxiety: reducing physical symptoms of anxiety and reducing time spent worrying (<30 minutes/day)  Depression: increasing interest in usual activities and reducing negative automatic thoughts    Treatment Plan/Recommendations:   · The treatment plan and follow up plan were reviewed with the patient.    Plan:  individual psychotherapy and continue medication management with  Sergio, NP psychiatry    Return to clinic: 2 weeks, earlier if needed. Pt to go to ED or call 911 if symptoms worsen or if she has thoughts of harming self and /or others. Pt verbalized understanding.       Length of Service (minutes): 45     Inflammation Suggestive Of Cancer Camouflage Histology Text: There was a dense lymphocytic infiltrate which prevented adequate histologic evaluation of adjacent structures.

## 2020-07-29 ENCOUNTER — OFFICE VISIT (OUTPATIENT)
Dept: PSYCHIATRY | Facility: CLINIC | Age: 20
End: 2020-07-29
Payer: COMMERCIAL

## 2020-07-29 VITALS
SYSTOLIC BLOOD PRESSURE: 116 MMHG | BODY MASS INDEX: 30.95 KG/M2 | WEIGHT: 169.19 LBS | HEART RATE: 64 BPM | DIASTOLIC BLOOD PRESSURE: 75 MMHG | RESPIRATION RATE: 16 BRPM

## 2020-07-29 DIAGNOSIS — F43.21 ADJUSTMENT DISORDER WITH DEPRESSED MOOD: ICD-10-CM

## 2020-07-29 DIAGNOSIS — F41.1 GAD (GENERALIZED ANXIETY DISORDER): Primary | ICD-10-CM

## 2020-07-29 DIAGNOSIS — F42.9 OBSESSIVE-COMPULSIVE DISORDER, UNSPECIFIED TYPE: ICD-10-CM

## 2020-07-29 PROCEDURE — 99999 PR PBB SHADOW E&M-EST. PATIENT-LVL III: ICD-10-PCS | Mod: PBBFAC,,, | Performed by: NURSE PRACTITIONER

## 2020-07-29 PROCEDURE — 90834 PR PSYCHOTHERAPY W/PATIENT, 45 MIN: ICD-10-PCS | Mod: S$GLB,,, | Performed by: SOCIAL WORKER

## 2020-07-29 PROCEDURE — 99214 PR OFFICE/OUTPT VISIT, EST, LEVL IV, 30-39 MIN: ICD-10-PCS | Mod: S$GLB,,, | Performed by: NURSE PRACTITIONER

## 2020-07-29 PROCEDURE — 90833 PR PSYCHOTHERAPY W/PATIENT W/E&M, 30 MIN (ADD ON): ICD-10-PCS | Mod: S$GLB,,, | Performed by: NURSE PRACTITIONER

## 2020-07-29 PROCEDURE — 90833 PSYTX W PT W E/M 30 MIN: CPT | Mod: S$GLB,,, | Performed by: NURSE PRACTITIONER

## 2020-07-29 PROCEDURE — 99999 PR PBB SHADOW E&M-EST. PATIENT-LVL III: CPT | Mod: PBBFAC,,, | Performed by: NURSE PRACTITIONER

## 2020-07-29 PROCEDURE — 99214 OFFICE O/P EST MOD 30 MIN: CPT | Mod: S$GLB,,, | Performed by: NURSE PRACTITIONER

## 2020-07-29 PROCEDURE — 3008F PR BODY MASS INDEX (BMI) DOCUMENTED: ICD-10-PCS | Mod: CPTII,S$GLB,, | Performed by: NURSE PRACTITIONER

## 2020-07-29 PROCEDURE — 90834 PSYTX W PT 45 MINUTES: CPT | Mod: S$GLB,,, | Performed by: SOCIAL WORKER

## 2020-07-29 PROCEDURE — 3008F BODY MASS INDEX DOCD: CPT | Mod: CPTII,S$GLB,, | Performed by: NURSE PRACTITIONER
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
## 2020-08-01 NOTE — PROGRESS NOTES
"Individual Psychotherapy (PhD/LCSW)    7/29/2020    Site:  [facility]         Therapeutic Intervention: Met with patient.  Outpatient - Insight oriented psychotherapy 45 min - CPT code 61655, Outpatient - Behavior modifying psychotherapy 45 min - CPT code 34760 and Outpatient - Supportive psychotherapy 45 min - CPT Code 37474    Chief complaint/reason for encounter: depression and anxiety     Interval history and content of current session:  Pt euthymic, reports mood is "good ", quit her [employer] job due to difficult manager and new girl was getting paid much more than patient and she has been there a long time. 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  Pt is maintaining boundaries with her mother and certain peers.  Focused on supportive and insight oriented therapy, validation, CBT, use of healthy coping sklls and family dynamics. Pt denies any current SI/HI. Denies any current A/VH.     Treatment plan:  · Target symptoms: depression, anxiety   · Why chosen therapy is appropriate versus another modality: relevant to diagnosis, patient responds to this modality, evidence based practice  · Outcome monitoring methods: self-report, observation  · Therapeutic intervention type: insight oriented psychotherapy, behavior modifying psychotherapy, supportive psychotherapy    Risk parameters:  Patient reports no suicidal ideation  Patient reports no homicidal ideation  Patient reports no self-injurious behavior  Patient reports no violent behavior    Verbal deficits: None    Patient's response to intervention:  The patient's response to intervention is accepting.    Progress toward goals and other mental status changes:  The patient's progress toward goals is fair .    Diagnosis:     ICD-10-CM ICD-9-CM   1. ANANYA (generalized anxiety disorder)  F41.1 300.02   2. Adjustment disorder with depressed mood  F43.21 309.0   Treatment Goals:  Specify outcomes written in observable, behavioral terms:   Anxiety: reducing " physical symptoms of anxiety and reducing time spent worrying (<30 minutes/day)  Depression: increasing interest in usual activities and reducing negative automatic thoughts    Treatment Plan/Recommendations:   · The treatment plan and follow up plan were reviewed with the patient.    Plan:  individual psychotherapy and continue medication management with  Sergio, NP psychiatry    Return to clinic: 2 weeks, earlier if needed. Pt to go to ED or call 911 if symptoms worsen or if she has thoughts of harming self and /or others. Pt verbalized understanding.       Length of Service (minutes): 45

## 2020-09-09 ENCOUNTER — OFFICE VISIT (OUTPATIENT)
Dept: PSYCHIATRY | Facility: CLINIC | Age: 20
End: 2020-09-09
Payer: COMMERCIAL

## 2020-09-09 DIAGNOSIS — F43.21 ADJUSTMENT DISORDER WITH DEPRESSED MOOD: ICD-10-CM

## 2020-09-09 DIAGNOSIS — F41.1 GAD (GENERALIZED ANXIETY DISORDER): Primary | ICD-10-CM

## 2020-09-09 PROCEDURE — 90834 PSYTX W PT 45 MINUTES: CPT | Mod: S$GLB,,, | Performed by: SOCIAL WORKER

## 2020-09-09 PROCEDURE — 90834 PR PSYCHOTHERAPY W/PATIENT, 45 MIN: ICD-10-PCS | Mod: S$GLB,,, | Performed by: SOCIAL WORKER

## 2020-09-09 NOTE — PROGRESS NOTES
"Individual Psychotherapy (PhD/LCSW)    9/9/2020    Site:  [facility]         Therapeutic Intervention: Met with patient.  Outpatient - Insight oriented psychotherapy 45 min - CPT code 88054, Outpatient - Behavior modifying psychotherapy 45 min - CPT code 84707 and Outpatient - Supportive psychotherapy 45 min - CPT Code 62422    Chief complaint/reason for encounter: depression and anxiety     Interval history and content of current session:  Pt dysphoric, reports mood is depressed, anxious, is tearful in session. Pt paternal GPx control her trust and she is not working, wants access to her trust, but cant get it until age 23. Has applied to a few "big girl" jobs, but states she is not ready to work in this type of job. She reports feeling depressed, reports her paternal family makes comments about her hair and her tattoos,  -that she should have saved her money instead. Pt reports she has gained weight, has low energy, low motivation. She feels fatigued. SHe does see friends frequently, lives on her own, feels none of her family are supportive. Focused on processing recent events, CBT - discussed options, working part time to help decrease depression, increase income and increase structure and routine to improve mood and to show GP that she is trying while she looks for that "big girl" job of wanting to be in police dispatch, for example.  Focused on supportive and insight oriented therapy, validation, CBT, use of healthy coping sklls and family dynamics. Strongly urged pt to exercise as well. Pt denies any current SI/HI. Denies any current A/VH.     Treatment plan:  · Target symptoms: depression, anxiety   · Why chosen therapy is appropriate versus another modality: relevant to diagnosis, patient responds to this modality, evidence based practice  · Outcome monitoring methods: self-report, observation  · Therapeutic intervention type: insight oriented psychotherapy, behavior modifying psychotherapy, " supportive psychotherapy    Risk parameters:  Patient reports no suicidal ideation  Patient reports no homicidal ideation  Patient reports no self-injurious behavior  Patient reports no violent behavior    Verbal deficits: None    Patient's response to intervention:  The patient's response to intervention is accepting.    Progress toward goals and other mental status changes:  The patient's progress toward goals is limited.    Diagnosis:     ICD-10-CM ICD-9-CM   1. ANANYA (generalized anxiety disorder)  F41.1 300.02   2. Adjustment disorder with depressed mood  F43.21 309.0   Treatment Goals:  Specify outcomes written in observable, behavioral terms:   Anxiety: reducing physical symptoms of anxiety and reducing time spent worrying (<30 minutes/day)  Depression: increasing interest in usual activities and reducing negative automatic thoughts    Treatment Plan/Recommendations:   · The treatment plan and follow up plan were reviewed with the patient.    Plan:  individual psychotherapy and continue medication management with Mr. Sergio NP psychiatry    Return to clinic: 1 week, earlier if needed. Pt to go to ED or call 911 if symptoms worsen or if she has thoughts of harming self and /or others. Pt verbalized understanding.       Length of Service (minutes): 45

## 2020-09-22 ENCOUNTER — OFFICE VISIT (OUTPATIENT)
Dept: PSYCHIATRY | Facility: CLINIC | Age: 20
End: 2020-09-22
Payer: COMMERCIAL

## 2020-09-22 DIAGNOSIS — F42.9 OBSESSIVE-COMPULSIVE DISORDER, UNSPECIFIED TYPE: ICD-10-CM

## 2020-09-22 DIAGNOSIS — F43.21 ADJUSTMENT DISORDER WITH DEPRESSED MOOD: ICD-10-CM

## 2020-09-22 DIAGNOSIS — F41.1 GAD (GENERALIZED ANXIETY DISORDER): Primary | ICD-10-CM

## 2020-09-22 PROCEDURE — 90834 PR PSYCHOTHERAPY W/PATIENT, 45 MIN: ICD-10-PCS | Mod: S$GLB,,, | Performed by: SOCIAL WORKER

## 2020-09-22 PROCEDURE — 90834 PSYTX W PT 45 MINUTES: CPT | Mod: S$GLB,,, | Performed by: SOCIAL WORKER

## 2020-09-22 NOTE — PROGRESS NOTES
Individual Psychotherapy (PhD/LCSW)    9/22/2020    Site:  [facility]         Therapeutic Intervention: Met with patient.  Outpatient - Insight oriented psychotherapy 45 min - CPT code 81603, Outpatient - Behavior modifying psychotherapy 45 min - CPT code 27934 and Outpatient - Supportive psychotherapy 45 min - CPT Code 31425    Chief complaint/reason for encounter: depression and anxiety     Interval history and content of current session:  Pt presents as Euthymic, smiling, reports mood is better compared to last appt , but still stressed re finances and looking for a job. She is not tearful today.  She is spending time with sisters and with friends. She has applied for jobs but has not heard form any recently.  Is looking forward to graduation party next Thursday. Does not know how she will pay her rent and bills but does not think she should have to tell grandparents how she spends her money to get them to release her trust money early. Discussed DBT and assumptions re DBT. Also focused on processing recent events, CBT - discussed options, working part time to help decrease depression, increase income and increase structure and routine to improve mood .  Focused on supportive and insight oriented therapy, validation, CBT, use of healthy coping sklls and family dynamics. Pt denies any current SI/HI. Denies any current A/VH.     Treatment plan:  · Target symptoms: depression, anxiety   · Why chosen therapy is appropriate versus another modality: relevant to diagnosis, patient responds to this modality, evidence based practice  · Outcome monitoring methods: self-report, observation  · Therapeutic intervention type: insight oriented psychotherapy, behavior modifying psychotherapy, supportive psychotherapy    Risk parameters:  Patient reports no suicidal ideation  Patient reports no homicidal ideation  Patient reports no self-injurious behavior  Patient reports no violent behavior    Verbal deficits:  None    Patient's response to intervention:  The patient's response to intervention is accepting.    Progress toward goals and other mental status changes:  The patient's progress toward goals is fair .    Diagnosis:     ICD-10-CM ICD-9-CM   1. ANANYA (generalized anxiety disorder)  F41.1 300.02   2. Adjustment disorder with depressed mood  F43.21 309.0   3. Obsessive-compulsive disorder, unspecified type  F42.9 300.3   Treatment Goals:  Specify outcomes written in observable, behavioral terms:   Anxiety: reducing physical symptoms of anxiety and reducing time spent worrying (<30 minutes/day)  Depression: increasing interest in usual activities and reducing negative automatic thoughts    Treatment Plan/Recommendations:   · The treatment plan and follow up plan were reviewed with the patient.    Plan:  individual psychotherapy and continue medication management with Mr. Sergio NP psychiatry    Return to clinic: 1 week, earlier if needed. Pt to go to ED or call 911 if symptoms worsen or if she has thoughts of harming self and /or others. Pt verbalized understanding.       Length of Service (minutes): 45

## 2020-09-30 ENCOUNTER — OFFICE VISIT (OUTPATIENT)
Dept: PSYCHIATRY | Facility: CLINIC | Age: 20
End: 2020-09-30
Payer: COMMERCIAL

## 2020-09-30 DIAGNOSIS — F41.1 GAD (GENERALIZED ANXIETY DISORDER): Primary | ICD-10-CM

## 2020-09-30 DIAGNOSIS — F43.21 ADJUSTMENT DISORDER WITH DEPRESSED MOOD: ICD-10-CM

## 2020-09-30 PROCEDURE — 90834 PR PSYCHOTHERAPY W/PATIENT, 45 MIN: ICD-10-PCS | Mod: S$GLB,,, | Performed by: SOCIAL WORKER

## 2020-09-30 PROCEDURE — 90834 PSYTX W PT 45 MINUTES: CPT | Mod: S$GLB,,, | Performed by: SOCIAL WORKER

## 2020-09-30 NOTE — PROGRESS NOTES
"Individual Psychotherapy (PhD/LCSW)    9/30/2020    Site:  [facility]         Therapeutic Intervention: Met with patient.  Outpatient - Insight oriented psychotherapy 45 min - CPT code 55916, Outpatient - Behavior modifying psychotherapy 45 min - CPT code 70386 and Outpatient - Supportive psychotherapy 45 min - CPT Code 94380    Chief complaint/reason for encounter: depression and anxiety     Interval history and content of current session:  Pt presents as Euthymic, smiling, reports mood is "good I got a job." Pt got a part time job as an  and feels good about this. Her GP have helped her access her trust so she can get her rent paid as well, as pt told them about her job and what she needs to be able to pay her rent and bills. "I haven't felt this good in a long time. " She Is looking forward to graduation party tomorrow. Getting along well with her sisters and has several good friends.  Focused on support, validation, praise, self care, health and wellness, assertiveness, supportive and insight oriented therapy,  use of healthy coping skills and family dynamics. Pt denies any current SI/HI. Denies any current A/VH.     Treatment plan:  · Target symptoms: depression, anxiety   · Why chosen therapy is appropriate versus another modality: relevant to diagnosis, patient responds to this modality, evidence based practice  · Outcome monitoring methods: self-report, observation  · Therapeutic intervention type: insight oriented psychotherapy, behavior modifying psychotherapy, supportive psychotherapy    Risk parameters:  Patient reports no suicidal ideation  Patient reports no homicidal ideation  Patient reports no self-injurious behavior  Patient reports no violent behavior    Verbal deficits: None    Patient's response to intervention:  The patient's response to intervention is accepting.    Progress toward goals and other mental status changes:  The patient's progress toward goals is fair " .    Diagnosis:     ICD-10-CM ICD-9-CM   1. ANANYA (generalized anxiety disorder)  F41.1 300.02   2. Adjustment disorder with depressed mood  F43.21 309.0   Treatment Goals:  Specify outcomes written in observable, behavioral terms:   Anxiety: reducing physical symptoms of anxiety and reducing time spent worrying (<30 minutes/day)  Depression: increasing interest in usual activities and reducing negative automatic thoughts    Treatment Plan/Recommendations:   · The treatment plan and follow up plan were reviewed with the patient.    Plan:  individual psychotherapy and continue medication management with  NICOLE Hill psychiatry    Return to clinic: 2 weeks, earlier if needed. Pt to go to ED or call 911 if symptoms worsen or if she has thoughts of harming self and /or others. Pt verbalized understanding.       Length of Service (minutes): 45

## 2020-10-07 ENCOUNTER — TELEPHONE (OUTPATIENT)
Dept: PSYCHIATRY | Facility: CLINIC | Age: 20
End: 2020-10-07

## 2020-10-07 NOTE — TELEPHONE ENCOUNTER
"Pt states that she feels the zoloft 50mg tablets is "too much" and brings her "too down" as she feels the anxiety level has "lowered" since she started taking the zoloft.  She has lowered her own dosage to 25mg tablets and feels that this is a better dose for her as she no longer feels "down".  Pt would like to know if this is "okay."   "

## 2020-10-08 RX ORDER — SERTRALINE HYDROCHLORIDE 25 MG/1
25 TABLET, FILM COATED ORAL DAILY
Qty: 30 TABLET | Refills: 1 | Status: SHIPPED | OUTPATIENT
Start: 2020-10-08 | End: 2020-11-19 | Stop reason: ALTCHOICE

## 2020-10-08 NOTE — TELEPHONE ENCOUNTER
Can you please send new prescription for sertraline 25mg tablets to [facility] pharmacy on file?

## 2020-10-14 ENCOUNTER — OFFICE VISIT (OUTPATIENT)
Dept: PSYCHIATRY | Facility: CLINIC | Age: 20
End: 2020-10-14
Payer: COMMERCIAL

## 2020-10-14 DIAGNOSIS — F43.21 ADJUSTMENT DISORDER WITH DEPRESSED MOOD: ICD-10-CM

## 2020-10-14 DIAGNOSIS — F41.1 GAD (GENERALIZED ANXIETY DISORDER): Primary | ICD-10-CM

## 2020-10-14 PROCEDURE — 90834 PR PSYCHOTHERAPY W/PATIENT, 45 MIN: ICD-10-PCS | Mod: S$GLB,,, | Performed by: SOCIAL WORKER

## 2020-10-14 PROCEDURE — 90834 PSYTX W PT 45 MINUTES: CPT | Mod: S$GLB,,, | Performed by: SOCIAL WORKER

## 2020-10-15 NOTE — PROGRESS NOTES
"Individual Psychotherapy (PhD/LCSW)    10/14/2020    Site:  [facility] [place]         Therapeutic Intervention: Met with patient.  Outpatient - Insight oriented psychotherapy 45 min - CPT code 88328, Outpatient - Behavior modifying psychotherapy 45 min - CPT code 09906 and Outpatient - Supportive psychotherapy 45 min - CPT Code 17660    Chief complaint/reason for encounter: depression and anxiety     Interval history and content of current session:  Pt presents as Euthymic, reports mood is "oik", had graduation party and just found out that many family members had COVID and didn't tell her until recently. She got tested and is negative. Also best friend, her boyfriend and his friend, that pt is seeing, went out together and didn't invite pt, though they were all supposed to go together, which hurt pt feelings last weekend. Pt found 2nd part time job [employer] and will start next week. Pt also looking for a place to move to and is struggling financially. She decreased her zoloft bc felt to flat, wants to be able to have emotions but also keep her personality. Discussed with  Mr. Hill and he approved.  Focused on support, validation, praise, self care, health and wellness, benefit of exercise, assertiveness,   use of healthy coping skills and family dynamics. Pt denies any current SI/HI. Denies any current A/VH.     Treatment plan:  · Target symptoms: depression, anxiety   · Why chosen therapy is appropriate versus another modality: relevant to diagnosis, patient responds to this modality, evidence based practice  · Outcome monitoring methods: self-report, observation  · Therapeutic intervention type: insight oriented psychotherapy, behavior modifying psychotherapy, supportive psychotherapy    Risk parameters:  Patient reports no suicidal ideation  Patient reports no homicidal ideation  Patient reports no self-injurious behavior  Patient reports no violent behavior    Verbal deficits: None    Patient's response to " intervention:  The patient's response to intervention is accepting.    Progress toward goals and other mental status changes:  The patient's progress toward goals is fair .    Diagnosis:     ICD-10-CM ICD-9-CM   1. ANANYA (generalized anxiety disorder)  F41.1 300.02   2. Adjustment disorder with depressed mood  F43.21 309.0   Treatment Goals:  Specify outcomes written in observable, behavioral terms:   Anxiety: reducing physical symptoms of anxiety and reducing time spent worrying (<30 minutes/day)  Depression: increasing interest in usual activities and reducing negative automatic thoughts    Treatment Plan/Recommendations:   · The treatment plan and follow up plan were reviewed with the patient.    Plan:  individual psychotherapy and continue medication management with Mr. Sergio NP psychiatry    Return to clinic: 2 weeks, earlier if needed. Pt to go to ED or call 911 if symptoms worsen or if she has thoughts of harming self and /or others. Pt verbalized understanding.       Length of Service (minutes): 45

## 2020-10-19 ENCOUNTER — TELEPHONE (OUTPATIENT)
Dept: PSYCHIATRY | Facility: CLINIC | Age: 20
End: 2020-10-19

## 2020-10-19 NOTE — TELEPHONE ENCOUNTER
Pt called clinic to advise that she has tested positive for COVID.  She is also experiencing increased anxiety symptoms because of this such as nervousness and fear.  Please advise if you have any recommendations for pt to help with these symptoms.

## 2020-11-04 ENCOUNTER — OFFICE VISIT (OUTPATIENT)
Dept: PSYCHIATRY | Facility: CLINIC | Age: 20
End: 2020-11-04
Payer: COMMERCIAL

## 2020-11-04 DIAGNOSIS — F42.9 OBSESSIVE-COMPULSIVE DISORDER, UNSPECIFIED TYPE: ICD-10-CM

## 2020-11-04 DIAGNOSIS — F43.21 ADJUSTMENT DISORDER WITH DEPRESSED MOOD: ICD-10-CM

## 2020-11-04 DIAGNOSIS — F98.8 ATTENTION DEFICIT DISORDER, UNSPECIFIED HYPERACTIVITY PRESENCE: ICD-10-CM

## 2020-11-04 DIAGNOSIS — F41.1 GAD (GENERALIZED ANXIETY DISORDER): Primary | ICD-10-CM

## 2020-11-04 PROCEDURE — 90834 PR PSYCHOTHERAPY W/PATIENT, 45 MIN: ICD-10-PCS | Mod: S$GLB,,, | Performed by: SOCIAL WORKER

## 2020-11-04 PROCEDURE — 90834 PSYTX W PT 45 MINUTES: CPT | Mod: S$GLB,,, | Performed by: SOCIAL WORKER
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
## 2020-11-17 ENCOUNTER — OFFICE VISIT (OUTPATIENT)
Dept: PSYCHIATRY | Facility: CLINIC | Age: 20
End: 2020-11-17
Payer: COMMERCIAL

## 2020-11-17 DIAGNOSIS — F41.1 GAD (GENERALIZED ANXIETY DISORDER): Primary | ICD-10-CM

## 2020-11-17 DIAGNOSIS — F43.21 ADJUSTMENT DISORDER WITH DEPRESSED MOOD: ICD-10-CM

## 2020-11-17 PROCEDURE — 90834 PR PSYCHOTHERAPY W/PATIENT, 45 MIN: ICD-10-PCS | Mod: S$GLB,,, | Performed by: SOCIAL WORKER

## 2020-11-17 PROCEDURE — 90834 PSYTX W PT 45 MINUTES: CPT | Mod: S$GLB,,, | Performed by: SOCIAL WORKER

## 2020-11-18 NOTE — PROGRESS NOTES
Individual Psychotherapy (PhD/LCSW)    11/17/2020    Site:  [facility]         Therapeutic Intervention: Met with patient.  Outpatient - Insight oriented psychotherapy 45 min - CPT code 78804, Outpatient - Behavior modifying psychotherapy 45 min - CPT code 89884 and Outpatient - Supportive psychotherapy 45 min - CPT Code 53941    Chief complaint/reason for encounter: depression and anxiety     Interval history and content of current session:  Pt presents as Euthymic, reports ongoing stress re boyfriend and friend, and family. This was processed, explored, and focused on healthy coping skills, communication and boundaries.  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  Focused on support, validation, self care,    use of healthy coping skills and family dynamics. Pt denies any current SI/HI. Denies any current A/VH.       Treatment plan:  · Target symptoms: depression, anxiety   · Why chosen therapy is appropriate versus another modality: relevant to diagnosis, patient responds to this modality, evidence based practice  · Outcome monitoring methods: self-report, observation  · Therapeutic intervention type: insight oriented psychotherapy, behavior modifying psychotherapy, supportive psychotherapy    Risk parameters:  Patient reports no suicidal ideation  Patient reports no homicidal ideation  Patient reports no self-injurious behavior  Patient reports no violent behavior    Verbal deficits: None    Patient's response to intervention:  The patient's response to intervention is accepting.    Progress toward goals and other mental status changes:  The patient's progress toward goals is fair .    Diagnosis:     ICD-10-CM ICD-9-CM   1. ANANYA (generalized anxiety disorder)  F41.1 300.02   2. Adjustment disorder with depressed mood  F43.21 309.0   Treatment Goals:  Specify outcomes written in observable, behavioral terms:   Anxiety: reducing physical symptoms of anxiety and  reducing time spent worrying (<30 minutes/day)  Depression: increasing interest in usual activities and reducing negative automatic thoughts    Treatment Plan/Recommendations:   · The treatment plan and follow up plan were reviewed with the patient.    Plan:  individual psychotherapy and continue medication management with  Sergio, NICOLE psychiatry    Return to clinic: 2 weeks, earlier if needed. Pt to go to ED or call 911 if symptoms worsen or if she has thoughts of harming self and /or others. Pt verbalized understanding.       Length of Service (minutes): 45

## 2020-11-19 ENCOUNTER — OFFICE VISIT (OUTPATIENT)
Dept: PSYCHIATRY | Facility: CLINIC | Age: 20
End: 2020-11-19
Payer: COMMERCIAL

## 2020-11-19 VITALS
SYSTOLIC BLOOD PRESSURE: 128 MMHG | HEART RATE: 93 BPM | BODY MASS INDEX: 31.58 KG/M2 | WEIGHT: 171.63 LBS | DIASTOLIC BLOOD PRESSURE: 87 MMHG | HEIGHT: 62 IN | RESPIRATION RATE: 16 BRPM

## 2020-11-19 DIAGNOSIS — F41.1 GAD (GENERALIZED ANXIETY DISORDER): Primary | ICD-10-CM

## 2020-11-19 DIAGNOSIS — F39 UNSPECIFIED MOOD (AFFECTIVE) DISORDER: ICD-10-CM

## 2020-11-19 PROCEDURE — 90833 PR PSYCHOTHERAPY W/PATIENT W/E&M, 30 MIN (ADD ON): ICD-10-PCS | Mod: S$GLB,,, | Performed by: NURSE PRACTITIONER

## 2020-11-19 PROCEDURE — 99214 OFFICE O/P EST MOD 30 MIN: CPT | Mod: S$GLB,,, | Performed by: NURSE PRACTITIONER

## 2020-11-19 PROCEDURE — 90833 PSYTX W PT W E/M 30 MIN: CPT | Mod: S$GLB,,, | Performed by: NURSE PRACTITIONER

## 2020-11-19 PROCEDURE — 1126F AMNT PAIN NOTED NONE PRSNT: CPT | Mod: S$GLB,,, | Performed by: NURSE PRACTITIONER

## 2020-11-19 PROCEDURE — 99999 PR PBB SHADOW E&M-EST. PATIENT-LVL III: CPT | Mod: PBBFAC,,, | Performed by: NURSE PRACTITIONER

## 2020-11-19 PROCEDURE — 99214 PR OFFICE/OUTPT VISIT, EST, LEVL IV, 30-39 MIN: ICD-10-PCS | Mod: S$GLB,,, | Performed by: NURSE PRACTITIONER

## 2020-11-19 PROCEDURE — 99999 PR PBB SHADOW E&M-EST. PATIENT-LVL III: ICD-10-PCS | Mod: PBBFAC,,, | Performed by: NURSE PRACTITIONER

## 2020-11-19 PROCEDURE — 1126F PR PAIN SEVERITY QUANTIFIED, NO PAIN PRESENT: ICD-10-PCS | Mod: S$GLB,,, | Performed by: NURSE PRACTITIONER

## 2020-11-19 PROCEDURE — 3008F PR BODY MASS INDEX (BMI) DOCUMENTED: ICD-10-PCS | Mod: CPTII,S$GLB,, | Performed by: NURSE PRACTITIONER

## 2020-11-19 PROCEDURE — 3008F BODY MASS INDEX DOCD: CPT | Mod: CPTII,S$GLB,, | Performed by: NURSE PRACTITIONER

## 2020-11-19 RX ORDER — TRAZODONE HYDROCHLORIDE 50 MG/1
50 TABLET ORAL NIGHTLY
Qty: 30 TABLET | Refills: 2 | Status: SHIPPED | OUTPATIENT
Start: 2020-11-19 | End: 2021-12-29

## 2020-11-19 RX ORDER — SPIRONOLACTONE 25 MG/1
TABLET ORAL
COMMUNITY
Start: 2020-09-24 | End: 2021-04-23 | Stop reason: ALTCHOICE
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
## 2020-12-10 ENCOUNTER — OFFICE VISIT (OUTPATIENT)
Dept: PSYCHIATRY | Facility: CLINIC | Age: 20
End: 2020-12-10
Payer: COMMERCIAL

## 2020-12-10 VITALS
BODY MASS INDEX: 32.04 KG/M2 | RESPIRATION RATE: 18 BRPM | HEART RATE: 61 BPM | DIASTOLIC BLOOD PRESSURE: 71 MMHG | WEIGHT: 175.13 LBS | TEMPERATURE: 98 F | SYSTOLIC BLOOD PRESSURE: 121 MMHG

## 2020-12-10 DIAGNOSIS — F39 UNSPECIFIED MOOD (AFFECTIVE) DISORDER: ICD-10-CM

## 2020-12-10 DIAGNOSIS — F41.1 GAD (GENERALIZED ANXIETY DISORDER): Primary | ICD-10-CM

## 2020-12-10 DIAGNOSIS — F42.9 OBSESSIVE-COMPULSIVE DISORDER, UNSPECIFIED TYPE: ICD-10-CM

## 2020-12-10 PROCEDURE — 3008F BODY MASS INDEX DOCD: CPT | Mod: CPTII,S$GLB,, | Performed by: NURSE PRACTITIONER

## 2020-12-10 PROCEDURE — 1126F AMNT PAIN NOTED NONE PRSNT: CPT | Mod: S$GLB,,, | Performed by: NURSE PRACTITIONER

## 2020-12-10 PROCEDURE — 90834 PR PSYCHOTHERAPY W/PATIENT, 45 MIN: ICD-10-PCS | Mod: S$GLB,,, | Performed by: SOCIAL WORKER

## 2020-12-10 PROCEDURE — 90834 PSYTX W PT 45 MINUTES: CPT | Mod: S$GLB,,, | Performed by: SOCIAL WORKER

## 2020-12-10 PROCEDURE — 90833 PR PSYCHOTHERAPY W/PATIENT W/E&M, 30 MIN (ADD ON): ICD-10-PCS | Mod: S$GLB,,, | Performed by: NURSE PRACTITIONER

## 2020-12-10 PROCEDURE — 3008F PR BODY MASS INDEX (BMI) DOCUMENTED: ICD-10-PCS | Mod: CPTII,S$GLB,, | Performed by: NURSE PRACTITIONER

## 2020-12-10 PROCEDURE — 99999 PR PBB SHADOW E&M-EST. PATIENT-LVL III: ICD-10-PCS | Mod: PBBFAC,,, | Performed by: NURSE PRACTITIONER

## 2020-12-10 PROCEDURE — 99999 PR PBB SHADOW E&M-EST. PATIENT-LVL III: CPT | Mod: PBBFAC,,, | Performed by: NURSE PRACTITIONER

## 2020-12-10 PROCEDURE — 1126F PR PAIN SEVERITY QUANTIFIED, NO PAIN PRESENT: ICD-10-PCS | Mod: S$GLB,,, | Performed by: NURSE PRACTITIONER

## 2020-12-10 PROCEDURE — 90833 PSYTX W PT W E/M 30 MIN: CPT | Mod: S$GLB,,, | Performed by: NURSE PRACTITIONER

## 2020-12-10 PROCEDURE — 99214 PR OFFICE/OUTPT VISIT, EST, LEVL IV, 30-39 MIN: ICD-10-PCS | Mod: S$GLB,,, | Performed by: NURSE PRACTITIONER

## 2020-12-10 PROCEDURE — 99214 OFFICE O/P EST MOD 30 MIN: CPT | Mod: S$GLB,,, | Performed by: NURSE PRACTITIONER
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
## 2020-12-10 NOTE — PROGRESS NOTES
"Individual Psychotherapy (PhD/LCSW)    12/10/2020    Site:  [facility]         Therapeutic Intervention: Met with patient.  Outpatient - Insight oriented psychotherapy 45 min - CPT code 67800, Outpatient - Behavior modifying psychotherapy 45 min - CPT code 20776 and Outpatient - Supportive psychotherapy 45 min - CPT Code 82932    Chief complaint/reason for encounter: depression and anxiety     Interval history and content of current session:  Pt presents as Euthymic, reports mood is "ok" today. Saw mother when she walked for Dec graduation and sister also graduated. Mom gave her a [facility] card and pt angry about that. Is considering seeing mom again. Pt and sibs had confrontation with dad's sister when visiting another aunt. Came out of no where and all 3 adult sibs stood up for each other, which helped pt to see that it is not just her - re family dysfunction. Her sisters see it as well. Pt and boyfriend doing well but some issues - discussed, processed. Focused on healthy coping skills, communication and boundaries, support, validation, self care. Pt denies any current SI/HI. Denies any current A/VH.       Treatment plan:  · Target symptoms: depression, anxiety   · Why chosen therapy is appropriate versus another modality: relevant to diagnosis, patient responds to this modality, evidence based practice  · Outcome monitoring methods: self-report, observation  · Therapeutic intervention type: insight oriented psychotherapy, behavior modifying psychotherapy, supportive psychotherapy    Risk parameters:  Patient reports no suicidal ideation  Patient reports no homicidal ideation  Patient reports no self-injurious behavior  Patient reports no violent behavior    Verbal deficits: None    Patient's response to intervention:  The patient's response to intervention is accepting.    Progress toward goals and other mental status changes:  The patient's progress toward goals is fair .    Diagnosis:     ICD-10-CM " ICD-9-CM   1. ANANYA (generalized anxiety disorder)  F41.1 300.02   2. Unspecified mood (affective) disorder  F39 296.90   Treatment Goals:  Specify outcomes written in observable, behavioral terms:   Anxiety: reducing physical symptoms of anxiety and reducing time spent worrying (<30 minutes/day)  Depression: increasing interest in usual activities and reducing negative automatic thoughts    Treatment Plan/Recommendations:   · The treatment plan and follow up plan were reviewed with the patient.    Plan:  individual psychotherapy and continue medication management with Mr. Sergio NP psychiatry    Return to clinic: 2 weeks, earlier if needed. Pt to go to ED or call 911 if symptoms worsen or if she has thoughts of harming self and /or others. Pt verbalized understanding.       Length of Service (minutes): 45

## 2020-12-17 RX ORDER — SERTRALINE HYDROCHLORIDE 25 MG/1
25 TABLET, FILM COATED ORAL DAILY
COMMUNITY
End: 2020-12-17 | Stop reason: SDUPTHER

## 2020-12-17 RX ORDER — SERTRALINE HYDROCHLORIDE 25 MG/1
25 TABLET, FILM COATED ORAL DAILY
Qty: 30 TABLET | Refills: 0 | Status: SHIPPED | OUTPATIENT
Start: 2020-12-17 | End: 2021-01-12

## 2020-12-17 RX ORDER — SERTRALINE HYDROCHLORIDE 25 MG/1
TABLET, FILM COATED ORAL
Qty: 30 TABLET | Refills: 1 | OUTPATIENT
Start: 2020-12-17

## 2021-01-21 ENCOUNTER — OFFICE VISIT (OUTPATIENT)
Dept: PSYCHIATRY | Facility: CLINIC | Age: 21
End: 2021-01-21
Payer: COMMERCIAL

## 2021-01-21 VITALS
SYSTOLIC BLOOD PRESSURE: 126 MMHG | WEIGHT: 180.88 LBS | DIASTOLIC BLOOD PRESSURE: 78 MMHG | HEIGHT: 62 IN | BODY MASS INDEX: 33.29 KG/M2 | HEART RATE: 94 BPM

## 2021-01-21 DIAGNOSIS — F42.9 OBSESSIVE-COMPULSIVE DISORDER, UNSPECIFIED TYPE: ICD-10-CM

## 2021-01-21 DIAGNOSIS — F41.1 GAD (GENERALIZED ANXIETY DISORDER): Primary | ICD-10-CM

## 2021-01-21 DIAGNOSIS — F39 UNSPECIFIED MOOD (AFFECTIVE) DISORDER: ICD-10-CM

## 2021-01-21 PROCEDURE — 99999 PR PBB SHADOW E&M-EST. PATIENT-LVL III: CPT | Mod: PBBFAC,,, | Performed by: NURSE PRACTITIONER

## 2021-01-21 PROCEDURE — 1126F PR PAIN SEVERITY QUANTIFIED, NO PAIN PRESENT: ICD-10-PCS | Mod: S$GLB,,, | Performed by: NURSE PRACTITIONER

## 2021-01-21 PROCEDURE — 99214 OFFICE O/P EST MOD 30 MIN: CPT | Mod: S$GLB,,, | Performed by: NURSE PRACTITIONER

## 2021-01-21 PROCEDURE — 99214 PR OFFICE/OUTPT VISIT, EST, LEVL IV, 30-39 MIN: ICD-10-PCS | Mod: S$GLB,,, | Performed by: NURSE PRACTITIONER

## 2021-01-21 PROCEDURE — 90833 PR PSYCHOTHERAPY W/PATIENT W/E&M, 30 MIN (ADD ON): ICD-10-PCS | Mod: S$GLB,,, | Performed by: NURSE PRACTITIONER

## 2021-01-21 PROCEDURE — 99999 PR PBB SHADOW E&M-EST. PATIENT-LVL III: ICD-10-PCS | Mod: PBBFAC,,, | Performed by: NURSE PRACTITIONER

## 2021-01-21 PROCEDURE — 90833 PSYTX W PT W E/M 30 MIN: CPT | Mod: S$GLB,,, | Performed by: NURSE PRACTITIONER

## 2021-01-21 PROCEDURE — 1126F AMNT PAIN NOTED NONE PRSNT: CPT | Mod: S$GLB,,, | Performed by: NURSE PRACTITIONER

## 2021-02-01 ENCOUNTER — TELEPHONE (OUTPATIENT)
Dept: PSYCHIATRY | Facility: CLINIC | Age: 21
End: 2021-02-01

## 2021-03-23 ENCOUNTER — OFFICE VISIT (OUTPATIENT)
Dept: PSYCHIATRY | Facility: CLINIC | Age: 21
End: 2021-03-23
Payer: COMMERCIAL

## 2021-03-23 VITALS
BODY MASS INDEX: 33.21 KG/M2 | HEIGHT: 62 IN | DIASTOLIC BLOOD PRESSURE: 92 MMHG | SYSTOLIC BLOOD PRESSURE: 133 MMHG | HEART RATE: 89 BPM | WEIGHT: 180.44 LBS

## 2021-03-23 DIAGNOSIS — F42.9 OBSESSIVE-COMPULSIVE DISORDER, UNSPECIFIED TYPE: Primary | ICD-10-CM

## 2021-03-23 DIAGNOSIS — F39 UNSPECIFIED MOOD (AFFECTIVE) DISORDER: ICD-10-CM

## 2021-03-23 DIAGNOSIS — F41.1 GAD (GENERALIZED ANXIETY DISORDER): ICD-10-CM

## 2021-03-23 PROCEDURE — 99999 PR PBB SHADOW E&M-EST. PATIENT-LVL III: CPT | Mod: PBBFAC,,, | Performed by: NURSE PRACTITIONER

## 2021-03-23 PROCEDURE — 99214 PR OFFICE/OUTPT VISIT, EST, LEVL IV, 30-39 MIN: ICD-10-PCS | Mod: S$GLB,,, | Performed by: NURSE PRACTITIONER

## 2021-03-23 PROCEDURE — 99214 OFFICE O/P EST MOD 30 MIN: CPT | Mod: S$GLB,,, | Performed by: NURSE PRACTITIONER

## 2021-03-23 PROCEDURE — 99999 PR PBB SHADOW E&M-EST. PATIENT-LVL III: ICD-10-PCS | Mod: PBBFAC,,, | Performed by: NURSE PRACTITIONER

## 2021-03-23 PROCEDURE — 90833 PSYTX W PT W E/M 30 MIN: CPT | Mod: S$GLB,,, | Performed by: NURSE PRACTITIONER

## 2021-03-23 PROCEDURE — 90833 PR PSYCHOTHERAPY W/PATIENT W/E&M, 30 MIN (ADD ON): ICD-10-PCS | Mod: S$GLB,,, | Performed by: NURSE PRACTITIONER

## 2021-04-23 ENCOUNTER — HOSPITAL ENCOUNTER (OUTPATIENT)
Dept: RADIOLOGY | Facility: HOSPITAL | Age: 21
Discharge: HOME | End: 2021-04-23
Attending: FAMILY MEDICINE
Payer: COMMERCIAL

## 2021-04-23 ENCOUNTER — TELEPHONE (OUTPATIENT)
Dept: FAMILY MEDICINE | Facility: CLINIC | Age: 21
End: 2021-04-23

## 2021-04-23 ENCOUNTER — OFFICE VISIT (OUTPATIENT)
Dept: FAMILY MEDICINE | Facility: CLINIC | Age: 21
End: 2021-04-23
Payer: COMMERCIAL

## 2021-04-23 VITALS
DIASTOLIC BLOOD PRESSURE: 72 MMHG | SYSTOLIC BLOOD PRESSURE: 131 MMHG | TEMPERATURE: 98 F | BODY MASS INDEX: 32.82 KG/M2 | HEIGHT: 62 IN | WEIGHT: 178.38 LBS | OXYGEN SATURATION: 98 % | HEART RATE: 75 BPM

## 2021-04-23 DIAGNOSIS — R10.9 ABDOMINAL PAIN, UNSPECIFIED ABDOMINAL LOCATION: ICD-10-CM

## 2021-04-23 DIAGNOSIS — K42.9 UMBILICAL HERNIA WITHOUT OBSTRUCTION AND WITHOUT GANGRENE: ICD-10-CM

## 2021-04-23 DIAGNOSIS — K42.9 UMBILICAL HERNIA WITHOUT OBSTRUCTION AND WITHOUT GANGRENE: Primary | ICD-10-CM

## 2021-04-23 DIAGNOSIS — Z23 NEED FOR TETANUS BOOSTER: ICD-10-CM

## 2021-04-23 DIAGNOSIS — Z01.419 ROUTINE GYNECOLOGICAL EXAMINATION: ICD-10-CM

## 2021-04-23 DIAGNOSIS — Z23 NEED FOR PNEUMOCOCCAL VACCINATION: ICD-10-CM

## 2021-04-23 PROCEDURE — 99203 PR OFFICE/OUTPT VISIT, NEW, LEVL III, 30-44 MIN: ICD-10-PCS | Mod: S$GLB,,, | Performed by: FAMILY MEDICINE

## 2021-04-23 PROCEDURE — 99203 OFFICE O/P NEW LOW 30 MIN: CPT | Mod: S$GLB,,, | Performed by: FAMILY MEDICINE

## 2021-04-23 PROCEDURE — 76705 ECHO EXAM OF ABDOMEN: CPT | Mod: TC,PO

## 2021-04-26 ENCOUNTER — TELEPHONE (OUTPATIENT)
Dept: FAMILY MEDICINE | Facility: CLINIC | Age: 21
End: 2021-04-26

## 2021-04-27 ENCOUNTER — OFFICE VISIT (OUTPATIENT)
Dept: PSYCHIATRY | Facility: CLINIC | Age: 21
End: 2021-04-27
Payer: COMMERCIAL

## 2021-04-27 VITALS
BODY MASS INDEX: 32.88 KG/M2 | DIASTOLIC BLOOD PRESSURE: 78 MMHG | WEIGHT: 178.69 LBS | HEIGHT: 62 IN | HEART RATE: 78 BPM | SYSTOLIC BLOOD PRESSURE: 138 MMHG

## 2021-04-27 DIAGNOSIS — F41.1 GAD (GENERALIZED ANXIETY DISORDER): Primary | ICD-10-CM

## 2021-04-27 DIAGNOSIS — F42.9 OBSESSIVE-COMPULSIVE DISORDER, UNSPECIFIED TYPE: ICD-10-CM

## 2021-04-27 DIAGNOSIS — F39 UNSPECIFIED MOOD (AFFECTIVE) DISORDER: ICD-10-CM

## 2021-04-27 PROCEDURE — 99999 PR PBB SHADOW E&M-EST. PATIENT-LVL III: CPT | Mod: PBBFAC,,, | Performed by: NURSE PRACTITIONER

## 2021-04-27 PROCEDURE — 99999 PR PBB SHADOW E&M-EST. PATIENT-LVL III: ICD-10-PCS | Mod: PBBFAC,,, | Performed by: NURSE PRACTITIONER

## 2021-04-27 PROCEDURE — 99214 OFFICE O/P EST MOD 30 MIN: CPT | Mod: ,,, | Performed by: NURSE PRACTITIONER

## 2021-04-27 PROCEDURE — 90833 PR PSYCHOTHERAPY W/PATIENT W/E&M, 30 MIN (ADD ON): ICD-10-PCS | Mod: ,,, | Performed by: NURSE PRACTITIONER

## 2021-04-27 PROCEDURE — 99214 PR OFFICE/OUTPT VISIT, EST, LEVL IV, 30-39 MIN: ICD-10-PCS | Mod: ,,, | Performed by: NURSE PRACTITIONER

## 2021-04-27 PROCEDURE — 99213 OFFICE O/P EST LOW 20 MIN: CPT | Mod: PBBFAC,PO | Performed by: NURSE PRACTITIONER

## 2021-04-27 PROCEDURE — 90833 PSYTX W PT W E/M 30 MIN: CPT | Mod: ,,, | Performed by: NURSE PRACTITIONER

## 2021-12-29 ENCOUNTER — TELEPHONE (OUTPATIENT)
Dept: FAMILY MEDICINE | Facility: CLINIC | Age: 21
End: 2021-12-29
Payer: COMMERCIAL

## 2022-01-12 ENCOUNTER — TELEPHONE (OUTPATIENT)
Dept: NEUROLOGY | Facility: CLINIC | Age: 22
End: 2022-01-12
Payer: COMMERCIAL

## 2022-01-12 DIAGNOSIS — R56.9 SEIZURE-LIKE ACTIVITY: Primary | ICD-10-CM

## 2022-01-12 NOTE — TELEPHONE ENCOUNTER
EP appt scheduled with Dr. Olmos on 02/10/22 @ 11:30 for Sz f/u per Dr. Olmos. MRI scheduled for 02/02/22 @ 2:30p. Message sent to Naveed at [facility] to call Pt to assist with setting up EEG. Pt is aware. Date, time and location confirmed.
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
## 2022-02-02 ENCOUNTER — HOSPITAL ENCOUNTER (OUTPATIENT)
Dept: RADIOLOGY | Facility: HOSPITAL | Age: 22
Discharge: HOME | End: 2022-02-02
Attending: PSYCHIATRY & NEUROLOGY
Payer: COMMERCIAL

## 2022-02-02 DIAGNOSIS — R56.9 SEIZURE-LIKE ACTIVITY: ICD-10-CM

## 2022-02-02 PROCEDURE — 70551 MRI BRAIN EPILEPSY WITHOUT CONTRAST: ICD-10-PCS | Mod: 26,,, | Performed by: RADIOLOGY

## 2022-02-02 PROCEDURE — 70551 MRI BRAIN STEM W/O DYE: CPT | Mod: 26,,, | Performed by: RADIOLOGY

## 2022-02-02 PROCEDURE — 70551 MRI BRAIN STEM W/O DYE: CPT | Mod: TC,PO

## 2022-02-03 ENCOUNTER — TELEPHONE (OUTPATIENT)
Dept: NEUROLOGY | Facility: CLINIC | Age: 22
End: 2022-02-03
Payer: COMMERCIAL

## 2022-02-03 NOTE — TELEPHONE ENCOUNTER
----- Message from Jay Jay Simons sent at 2/3/2022 12:00 PM CST -----  Regarding: ret call  Type:  Patient Returning Call    Who Called:  Paz    Who Left Message for Patient:  Kimmy ALVAREZ    Does the patient know what this is regarding?:  yes    Best Call Back Number:  [phone]     Additional Information:

## 2022-02-03 NOTE — TELEPHONE ENCOUNTER
"----- Message from Linda Olmos MD sent at 2/3/2022  9:02 AM CST -----  The brain MRI looks normal. Pineal cysts are common and this is "simple" and not concerning appearing. There are also some sinus changes. If you have sinus symptoms, be sure to see your primary care physician or ENT.   "

## 2022-02-07 ENCOUNTER — OFFICE VISIT (OUTPATIENT)
Dept: OBSTETRICS AND GYNECOLOGY | Facility: CLINIC | Age: 22
End: 2022-02-07
Payer: COMMERCIAL

## 2022-02-07 VITALS — WEIGHT: 162.69 LBS | DIASTOLIC BLOOD PRESSURE: 66 MMHG | SYSTOLIC BLOOD PRESSURE: 96 MMHG | BODY MASS INDEX: 29.76 KG/M2

## 2022-02-07 DIAGNOSIS — N94.6 DYSMENORRHEA: ICD-10-CM

## 2022-02-07 DIAGNOSIS — Z11.3 SCREENING EXAMINATION FOR STD (SEXUALLY TRANSMITTED DISEASE): ICD-10-CM

## 2022-02-07 DIAGNOSIS — G40.909 SEIZURE DISORDER: ICD-10-CM

## 2022-02-07 DIAGNOSIS — E34.8 PINEAL GLAND CYST: ICD-10-CM

## 2022-02-07 DIAGNOSIS — N92.0 MENORRHAGIA WITH REGULAR CYCLE: ICD-10-CM

## 2022-02-07 DIAGNOSIS — Z01.419 ROUTINE GYNECOLOGICAL EXAMINATION: Primary | ICD-10-CM

## 2022-02-07 DIAGNOSIS — E04.0 GOITER DIFFUSE: ICD-10-CM

## 2022-02-07 DIAGNOSIS — F17.200 SMOKER: ICD-10-CM

## 2022-02-07 DIAGNOSIS — Z12.4 SCREENING FOR CERVICAL CANCER: ICD-10-CM

## 2022-02-07 PROCEDURE — 87801 DETECT AGNT MULT DNA AMPLI: CPT | Performed by: OBSTETRICS & GYNECOLOGY

## 2022-02-07 PROCEDURE — 87624 HPV HI-RISK TYP POOLED RSLT: CPT | Performed by: OBSTETRICS & GYNECOLOGY

## 2022-02-07 PROCEDURE — 99385 PR PREVENTIVE VISIT,NEW,18-39: ICD-10-PCS | Mod: S$GLB,,, | Performed by: OBSTETRICS & GYNECOLOGY

## 2022-02-07 PROCEDURE — 88175 CYTOPATH C/V AUTO FLUID REDO: CPT | Performed by: PATHOLOGY

## 2022-02-07 PROCEDURE — 88141 PR  CYTOPATH CERV/VAG INTERPRET: ICD-10-PCS | Mod: ,,, | Performed by: PATHOLOGY

## 2022-02-07 PROCEDURE — 88141 CYTOPATH C/V INTERPRET: CPT | Mod: ,,, | Performed by: PATHOLOGY

## 2022-02-07 PROCEDURE — 87491 CHLMYD TRACH DNA AMP PROBE: CPT | Mod: 59 | Performed by: OBSTETRICS & GYNECOLOGY

## 2022-02-07 PROCEDURE — 99999 PR PBB SHADOW E&M-EST. PATIENT-LVL III: ICD-10-PCS | Mod: PBBFAC,,, | Performed by: OBSTETRICS & GYNECOLOGY

## 2022-02-07 PROCEDURE — 87591 N.GONORRHOEAE DNA AMP PROB: CPT | Performed by: OBSTETRICS & GYNECOLOGY

## 2022-02-07 PROCEDURE — 99385 PREV VISIT NEW AGE 18-39: CPT | Mod: S$GLB,,, | Performed by: OBSTETRICS & GYNECOLOGY

## 2022-02-07 PROCEDURE — 99999 PR PBB SHADOW E&M-EST. PATIENT-LVL III: CPT | Mod: PBBFAC,,, | Performed by: OBSTETRICS & GYNECOLOGY

## 2022-02-07 PROCEDURE — 87481 CANDIDA DNA AMP PROBE: CPT | Mod: 59 | Performed by: OBSTETRICS & GYNECOLOGY

## 2022-02-07 RX ORDER — TRANEXAMIC ACID 650 MG/1
1300 TABLET ORAL 3 TIMES DAILY
Qty: 12 TABLET | Refills: 11 | Status: SHIPPED | OUTPATIENT
Start: 2022-02-07 | End: 2023-12-06 | Stop reason: SDUPTHER

## 2022-02-07 RX ORDER — NAPROXEN SODIUM 550 MG/1
550 TABLET ORAL 2 TIMES DAILY WITH MEALS
Qty: 60 TABLET | Refills: 2 | Status: SHIPPED | OUTPATIENT
Start: 2022-02-07 | End: 2023-12-06 | Stop reason: SDUPTHER

## 2022-02-07 NOTE — PROGRESS NOTES
Chief Complaint   Patient presents with    Well Woman     C/o nodules on her thyroids, pt is undergoing testing for seizures, pt wants std screening       History of Present Illness: Paz Szymanski is a 21 y.o. female that presents today 2022 for well gyn visit.  She reports painful and heavy first 4 days. She reports that she feels like her goiter is larger.     Past Medical History:   Diagnosis Date    Goiter     Seizures     Thyroid nodule        Past Surgical History:   Procedure Laterality Date    NECK SURGERY      WRIST FRACTURE SURGERY         Current Outpatient Medications   Medication Sig Dispense Refill    naproxen sodium (ANAPROX) 550 MG tablet Take 1 tablet (550 mg total) by mouth 2 (two) times daily with meals. 60 tablet 2    tranexamic acid (LYSTEDA) 650 mg tablet Take 2 tablets (1,300 mg total) by mouth 3 (three) times daily. 12 tablet 11     No current facility-administered medications for this visit.       Review of patient's allergies indicates:   Allergen Reactions    Minocycline Hives     Unsure if still allergic, has had antibiotics containing and was ok    Penicillins        Family History   Problem Relation Age of Onset    No Known Problems Mother     Tremor Father     Cancer Father     Diabetes Father        Social History     Socioeconomic History    Marital status: Single   Tobacco Use    Smoking status: Current Some Day Smoker     Types: Vaping with nicotine    Smokeless tobacco: Never Used   Substance and Sexual Activity    Alcohol use: Yes     Comment: social    Drug use: Yes     Types: Marijuana     Comment: last use yesterday    Sexual activity: Not Currently     Partners: Male       OB History    Para Term  AB Living   0 0 0 0 0 0   SAB IAB Ectopic Multiple Live Births   0 0 0 0 0       Review of Symptoms:  GENERAL: Denies weight gain or weight loss. Feeling well overall.   SKIN: Denies rash or lesions.   HEAD: Denies head injury or headache.    NODES: Denies enlarged lymph nodes.   CHEST: Denies chest pain or shortness of breath.   CARDIOVASCULAR: Denies palpitations or left sided chest pain.   ABDOMEN: No abdominal pain, constipation, diarrhea, nausea, vomiting or rectal bleeding.   URINARY: No frequency, dysuria, hematuria, or burning on urination.  HEMATOLOGIC: No easy bruisability or excessive bleeding.   MUSCULOSKELETAL: Denies joint pain or swelling.     BP 96/66   Wt 73.8 kg (162 lb 11.2 oz)   LMP 02/04/2022   Physical Exam:  APPEARANCE: Well nourished, well developed, in no acute distress.  SKIN: Normal skin turgor, no lesions.  NECK: Neck symmetric with goiter +++  RESPIRATORY: Normal respiratory effort with no retractions or use of accessory muscles  CARDIOVASCULAR: Peripheral vascular system with no swelling no varicosities and palpation of pulses normal  LYMPHATIC: No enlargements of the lymph nodes noted in the neck, axillae, or groin  ABDOMEN: Soft. No tenderness or masses. No hepatosplenomegaly. No hernias.  BREASTS: Symmetrical, no skin changes or visible lesions. No palpable masses, nipple discharge or adenopathy bilaterally.  PELVIC: Normal external female genitalia without lesions. Normal hair distribution. Adequate perineal body, normal urethral meatus. Urethra with no masses.  Bladder nontender. Vagina moist and well rugated without lesions or discharge. Cervix pink and without lesions. No significant cystocele or rectocele. Bimanual exam showed uterus normal size, shape, position, mobile and nontender. Adnexa without masses or tenderness. Urethra and bladder normal.   EXTREMITIES: No clubbing cyanosis or edema.    ASSESSMENT/PLAN:  Routine gynecological examination    Screening for cervical cancer  -     Ambulatory referral/consult to Obstetrics / Gynecology  -     Liquid-Based Pap Smear, Screening    Screening examination for STD (sexually transmitted disease)  -     C. trachomatis/N. gonorrhoeae by AMP DNA [facility];  Cervicovaginal  -     Vaginosis Screen by DNA Probe    Smoker    Pineal gland cyst    Seizure disorder    Dysmenorrhea  -     naproxen sodium (ANAPROX) 550 MG tablet; Take 1 tablet (550 mg total) by mouth 2 (two) times daily with meals.  Dispense: 60 tablet; Refill: 2    Menorrhagia with regular cycle  -     tranexamic acid (LYSTEDA) 650 mg tablet; Take 2 tablets (1,300 mg total) by mouth 3 (three) times daily.  Dispense: 12 tablet; Refill: 11    Goiter diffuse  -     US Thyroid; Future; Expected date: 02/07/2022          Patient was counseled today on Pelvic exams and Pap Smear guidelines.   We discussed STD screening if at high risk for a STD.  We discussed recommendation for breast cancer screening with mammogram every other year after the age of 40 and annually after the age of 50.    We discussed colon cancer screening when indicated.   Osteoporosis screening discussed when indicated.   She was advised to see her primary care physician for all other health maintenance.     FOLLOW-UP with me for next routine visit.

## 2022-02-08 ENCOUNTER — TELEPHONE (OUTPATIENT)
Dept: NEUROLOGY | Facility: CLINIC | Age: 22
End: 2022-02-08
Payer: COMMERCIAL

## 2022-02-08 NOTE — TELEPHONE ENCOUNTER
Call placed to Pt to inform that her appt with  scheduled for 2/10/22 would need to be canceled d/t provider being out of the clinic this week. Pt offered a appt in the next avail slot. Pt refused stating she was not happy with the fact she had not received her EEG results that was performed on 02/02/22 and would like more information on the results of her MRI that was performed on 02/02/22. Pt has already reviewed the MRI results and feels she needs to be seen sooner. It was explained to Pt that she was offered the first avail that was viewable to me at this time. Pt advised that when Dr. Olmos returns this may change after she has viewed her schedule. Pt became angry and is considering a new provider. Numbers to [facility] and [facility] provided. Message forwarded to Dr. Olmos for review.

## 2022-02-09 ENCOUNTER — HOSPITAL ENCOUNTER (OUTPATIENT)
Dept: RADIOLOGY | Facility: HOSPITAL | Age: 22
Discharge: HOME | End: 2022-02-09
Attending: OBSTETRICS & GYNECOLOGY
Payer: COMMERCIAL

## 2022-02-09 DIAGNOSIS — E04.0 GOITER DIFFUSE: ICD-10-CM

## 2022-02-09 LAB
C TRACH DNA SPEC QL NAA+PROBE: NOT DETECTED
N GONORRHOEA DNA SPEC QL NAA+PROBE: NOT DETECTED

## 2022-02-09 PROCEDURE — 76536 US EXAM OF HEAD AND NECK: CPT | Mod: TC,PO

## 2022-02-09 PROCEDURE — 76536 US EXAM OF HEAD AND NECK: CPT | Mod: 26,,, | Performed by: RADIOLOGY

## 2022-02-09 PROCEDURE — 76536 US THYROID: ICD-10-PCS | Mod: 26,,, | Performed by: RADIOLOGY

## 2022-02-09 NOTE — TELEPHONE ENCOUNTER
Call placed to Pt to review EEG results. Per Dr. Olmos, the EEG was normal. Pt verbalized understanding and states she will seek services elsewhere.

## 2022-02-10 LAB
BACTERIAL VAGINOSIS DNA: NEGATIVE
CANDIDA GLABRATA DNA: NEGATIVE
CANDIDA KRUSEI DNA: NEGATIVE
CANDIDA RRNA VAG QL PROBE: POSITIVE
T VAGINALIS RRNA GENITAL QL PROBE: NEGATIVE

## 2022-02-11 ENCOUNTER — PATIENT MESSAGE (OUTPATIENT)
Dept: OBSTETRICS AND GYNECOLOGY | Facility: CLINIC | Age: 22
End: 2022-02-11
Payer: COMMERCIAL

## 2022-02-14 LAB
FINAL PATHOLOGIC DIAGNOSIS: ABNORMAL
Lab: ABNORMAL

## 2022-02-15 ENCOUNTER — PATIENT MESSAGE (OUTPATIENT)
Dept: OBSTETRICS AND GYNECOLOGY | Facility: CLINIC | Age: 22
End: 2022-02-15
Payer: COMMERCIAL

## 2022-02-17 LAB
HPV HR 12 DNA SPEC QL NAA+PROBE: NEGATIVE
HPV16 AG SPEC QL: NEGATIVE
HPV18 DNA SPEC QL NAA+PROBE: NEGATIVE

## 2023-11-08 ENCOUNTER — TELEPHONE (OUTPATIENT)
Dept: OBSTETRICS AND GYNECOLOGY | Facility: CLINIC | Age: 23
End: 2023-11-08
Payer: COMMERCIAL

## 2023-12-06 ENCOUNTER — OFFICE VISIT (OUTPATIENT)
Dept: OBSTETRICS AND GYNECOLOGY | Facility: CLINIC | Age: 23
End: 2023-12-06
Payer: COMMERCIAL

## 2023-12-06 VITALS
DIASTOLIC BLOOD PRESSURE: 72 MMHG | HEIGHT: 62 IN | SYSTOLIC BLOOD PRESSURE: 100 MMHG | WEIGHT: 192.88 LBS | BODY MASS INDEX: 35.49 KG/M2

## 2023-12-06 DIAGNOSIS — Z72.0 TOBACCO USE: ICD-10-CM

## 2023-12-06 DIAGNOSIS — Z01.419 ROUTINE GYNECOLOGICAL EXAMINATION: Primary | ICD-10-CM

## 2023-12-06 DIAGNOSIS — R87.610 ASCUS OF CERVIX WITH NEGATIVE HIGH RISK HPV: ICD-10-CM

## 2023-12-06 DIAGNOSIS — N92.0 MENORRHAGIA WITH REGULAR CYCLE: ICD-10-CM

## 2023-12-06 DIAGNOSIS — R63.5 WEIGHT GAIN: ICD-10-CM

## 2023-12-06 DIAGNOSIS — N94.6 DYSMENORRHEA: ICD-10-CM

## 2023-12-06 PROCEDURE — 99999 PR PBB SHADOW E&M-EST. PATIENT-LVL III: ICD-10-PCS | Mod: PBBFAC,,, | Performed by: OBSTETRICS & GYNECOLOGY

## 2023-12-06 PROCEDURE — 99395 PR PREVENTIVE VISIT,EST,18-39: ICD-10-PCS | Mod: S$GLB,,, | Performed by: OBSTETRICS & GYNECOLOGY

## 2023-12-06 PROCEDURE — 99999 PR PBB SHADOW E&M-EST. PATIENT-LVL III: CPT | Mod: PBBFAC,,, | Performed by: OBSTETRICS & GYNECOLOGY

## 2023-12-06 PROCEDURE — 99395 PREV VISIT EST AGE 18-39: CPT | Mod: S$GLB,,, | Performed by: OBSTETRICS & GYNECOLOGY

## 2023-12-06 PROCEDURE — 87624 HPV HI-RISK TYP POOLED RSLT: CPT | Performed by: OBSTETRICS & GYNECOLOGY

## 2023-12-06 PROCEDURE — 88175 CYTOPATH C/V AUTO FLUID REDO: CPT | Performed by: OBSTETRICS & GYNECOLOGY

## 2023-12-06 RX ORDER — TRAZODONE HYDROCHLORIDE 50 MG/1
50 TABLET ORAL NIGHTLY
COMMUNITY

## 2023-12-06 RX ORDER — NAPROXEN SODIUM 550 MG/1
550 TABLET ORAL 2 TIMES DAILY WITH MEALS
Qty: 60 TABLET | Refills: 2 | Status: SHIPPED | OUTPATIENT
Start: 2023-12-06

## 2023-12-06 RX ORDER — FLUOXETINE HYDROCHLORIDE 40 MG/1
40 CAPSULE ORAL
COMMUNITY
Start: 2023-11-09

## 2023-12-06 RX ORDER — CETIRIZINE HYDROCHLORIDE 10 MG/1
10 TABLET ORAL DAILY PRN
COMMUNITY
Start: 2023-11-28

## 2023-12-06 RX ORDER — TRANEXAMIC ACID 650 MG/1
1300 TABLET ORAL 3 TIMES DAILY
Qty: 12 TABLET | Refills: 11 | Status: SHIPPED | OUTPATIENT
Start: 2023-12-06

## 2023-12-06 NOTE — PROGRESS NOTES
Chief Complaint   Patient presents with    Well Woman       History of Present Illness: Paz Szymanski is a 23 y.o. female that presents today 12/6/2023 with Patient's last menstrual period was 11/29/2023 (approximate).  for well gyn visit.  She reports heavy periods. She reports getting up at night to change.  She reports using 2-3 diapers a day.  She reports 5 days of bleeding. She does not want any medication for her periods.       Past Medical History:   Diagnosis Date    Goiter     Seizures     Thyroid nodule        Past Surgical History:   Procedure Laterality Date    CYST REMOVAL  2019    Left Cheek    NECK SURGERY      WRIST FRACTURE SURGERY         Outpatient Medications Prior to Visit   Medication Sig Dispense Refill    cetirizine (ZYRTEC) 10 MG tablet Take 10 mg by mouth daily as needed.      FLUoxetine 40 MG capsule Take 40 mg by mouth.      traZODone (DESYREL) 50 MG tablet Take 50 mg by mouth every evening.      naproxen sodium (ANAPROX) 550 MG tablet Take 1 tablet (550 mg total) by mouth 2 (two) times daily with meals. 60 tablet 2    tranexamic acid (LYSTEDA) 650 mg tablet Take 2 tablets (1,300 mg total) by mouth 3 (three) times daily. (Patient not taking: Reported on 12/6/2023) 12 tablet 11     No facility-administered medications prior to visit.       Review of patient's allergies indicates:   Allergen Reactions    Minocycline Hives     Unsure if still allergic, has had antibiotics containing and was ok    Penicillins        Family History   Problem Relation Age of Onset    No Known Problems Mother     Tremor Father     Cancer Father     Diabetes Father        Social History     Socioeconomic History    Marital status: Single   Tobacco Use    Smoking status: Some Days     Types: Vaping with nicotine    Smokeless tobacco: Never   Substance and Sexual Activity    Alcohol use: Yes     Comment: social    Drug use: Yes     Types: Marijuana     Comment: last use yesterday    Sexual activity: Not Currently     " Partners: Male       OB History    Para Term  AB Living   0 0 0 0 0 0   SAB IAB Ectopic Multiple Live Births   0 0 0 0 0       Review of Symptoms:  GENERAL: Denies weight gain or weight loss. Feeling well overall.   SKIN: Denies rash or lesions.   HEAD: Denies head injury or headache.   NODES: Denies enlarged lymph nodes.   CHEST: Denies chest pain or shortness of breath.   CARDIOVASCULAR: Denies palpitations or left sided chest pain.   ABDOMEN: No abdominal pain, constipation, diarrhea, nausea, vomiting or rectal bleeding.   URINARY: No frequency, dysuria, hematuria, or burning on urination.  HEMATOLOGIC: No easy bruisability or excessive bleeding.   MUSCULOSKELETAL: Denies joint pain or swelling.     /72   Ht 5' 2" (1.575 m)   Wt 87.5 kg (192 lb 14.4 oz)   LMP 2023 (Approximate)   Physical Exam:  APPEARANCE: Well nourished, well developed, in no acute distress.  SKIN: Normal skin turgor, no lesions.  NECK: Neck symmetric without masses   RESPIRATORY: Normal respiratory effort with no retractions or use of accessory muscles  CARDIOVASCULAR: Peripheral vascular system with no swelling no varicosities and palpation of pulses normal  LYMPHATIC: No enlargements of the lymph nodes noted in the neck, axillae, or groin  ABDOMEN: Soft. No tenderness or masses. No hepatosplenomegaly. No hernias.  BREASTS: Symmetrical, no skin changes or visible lesions. No palpable masses, nipple discharge or adenopathy bilaterally.  PELVIC: Normal external female genitalia without lesions. Normal hair distribution. Adequate perineal body, normal urethral meatus. Urethra with no masses.  Bladder nontender. Vagina moist and well rugated without lesions or discharge. Cervix pink and without lesions. No significant cystocele or rectocele. Bimanual exam showed uterus normal size, shape, position, mobile and nontender. Adnexa without masses or tenderness. Urethra and bladder normal.   EXTREMITIES: No clubbing " cyanosis or edema.    ASSESSMENT/PLAN:  Routine gynecological examination    ASCUS of cervix with negative high risk HPV  -     Liquid-Based Pap Smear, Screening  -     HPV High Risk Genotypes, PCR    Dysmenorrhea  -     naproxen sodium (ANAPROX) 550 MG tablet; Take 1 tablet (550 mg total) by mouth 2 (two) times daily with meals.  Dispense: 60 tablet; Refill: 2    Tobacco use    Menorrhagia with regular cycle  -     CBC W/ AUTO DIFFERENTIAL; Future; Expected date: 12/06/2023  -     tranexamic acid (LYSTEDA) 650 mg tablet; Take 2 tablets (1,300 mg total) by mouth 3 (three) times daily.  Dispense: 12 tablet; Refill: 11    Weight gain          Patient was counseled today on Pelvic exams and Pap Smear guidelines.   We discussed STD screening if at high risk for a STD.  We discussed recommendation for breast cancer screening with mammogram every other year after the age of 40 and annually after the age of 50.    We discussed colon cancer screening when indicated.   Osteoporosis screening discussed when indicated.   She was advised to see her primary care physician for all other health maintenance.     FOLLOW-UP with me for next routine visit.

## 2023-12-15 ENCOUNTER — LAB VISIT (OUTPATIENT)
Dept: LAB | Facility: HOSPITAL | Age: 23
End: 2023-12-15
Attending: PHYSICIAN ASSISTANT
Payer: COMMERCIAL

## 2023-12-15 DIAGNOSIS — N92.0 MENORRHAGIA WITH REGULAR CYCLE: ICD-10-CM

## 2023-12-15 DIAGNOSIS — R23.2 HOT FLASH NOT DUE TO MENOPAUSE: ICD-10-CM

## 2023-12-15 LAB
BASOPHILS # BLD AUTO: 0.05 K/UL (ref 0–0.2)
BASOPHILS NFR BLD: 0.6 % (ref 0–1.9)
DIFFERENTIAL METHOD: ABNORMAL
EOSINOPHIL # BLD AUTO: 0.1 K/UL (ref 0–0.5)
EOSINOPHIL NFR BLD: 1.5 % (ref 0–8)
ERYTHROCYTE [DISTWIDTH] IN BLOOD BY AUTOMATED COUNT: 11.9 % (ref 11.5–14.5)
ESTIMATED AVG GLUCOSE: 94 MG/DL (ref 68–131)
HBA1C MFR BLD: 4.9 % (ref 4–5.6)
HCT VFR BLD AUTO: 40 % (ref 37–48.5)
HGB BLD-MCNC: 13.7 G/DL (ref 12–16)
IMM GRANULOCYTES # BLD AUTO: 0.03 K/UL (ref 0–0.04)
IMM GRANULOCYTES NFR BLD AUTO: 0.3 % (ref 0–0.5)
LYMPHOCYTES # BLD AUTO: 2.1 K/UL (ref 1–4.8)
LYMPHOCYTES NFR BLD: 24.5 % (ref 18–48)
MCH RBC QN AUTO: 35.3 PG (ref 27–31)
MCHC RBC AUTO-ENTMCNC: 34.3 G/DL (ref 32–36)
MCV RBC AUTO: 103 FL (ref 82–98)
MONOCYTES # BLD AUTO: 0.6 K/UL (ref 0.3–1)
MONOCYTES NFR BLD: 7.4 % (ref 4–15)
NEUTROPHILS # BLD AUTO: 5.7 K/UL (ref 1.8–7.7)
NEUTROPHILS NFR BLD: 65.7 % (ref 38–73)
NRBC BLD-RTO: 0 /100 WBC
PLATELET # BLD AUTO: 335 K/UL (ref 150–450)
PMV BLD AUTO: 11.3 FL (ref 9.2–12.9)
RBC # BLD AUTO: 3.88 M/UL (ref 4–5.4)
WBC # BLD AUTO: 8.69 K/UL (ref 3.9–12.7)

## 2023-12-15 PROCEDURE — 85025 COMPLETE CBC W/AUTO DIFF WBC: CPT | Performed by: OBSTETRICS & GYNECOLOGY

## 2023-12-15 PROCEDURE — 83036 HEMOGLOBIN GLYCOSYLATED A1C: CPT | Performed by: PHYSICIAN ASSISTANT

## 2023-12-15 PROCEDURE — 36415 COLL VENOUS BLD VENIPUNCTURE: CPT | Mod: PO | Performed by: OBSTETRICS & GYNECOLOGY

## 2023-12-18 ENCOUNTER — PATIENT MESSAGE (OUTPATIENT)
Dept: OBSTETRICS AND GYNECOLOGY | Facility: CLINIC | Age: 23
End: 2023-12-18
Payer: COMMERCIAL

## 2023-12-18 DIAGNOSIS — N92.0 MENORRHAGIA WITH REGULAR CYCLE: Primary | ICD-10-CM

## 2023-12-19 LAB
FINAL PATHOLOGIC DIAGNOSIS: NORMAL
Lab: NORMAL

## 2023-12-20 ENCOUNTER — LAB VISIT (OUTPATIENT)
Dept: LAB | Facility: HOSPITAL | Age: 23
End: 2023-12-20
Attending: OBSTETRICS & GYNECOLOGY
Payer: COMMERCIAL

## 2023-12-20 DIAGNOSIS — N92.0 MENORRHAGIA WITH REGULAR CYCLE: ICD-10-CM

## 2023-12-20 LAB
IRON SERPL-MCNC: 105 UG/DL (ref 30–160)
SATURATED IRON: 22 % (ref 20–50)
TOTAL IRON BINDING CAPACITY: 475 UG/DL (ref 250–450)
TRANSFERRIN SERPL-MCNC: 321 MG/DL (ref 200–375)

## 2023-12-20 PROCEDURE — 82607 VITAMIN B-12: CPT | Performed by: OBSTETRICS & GYNECOLOGY

## 2023-12-20 PROCEDURE — 83540 ASSAY OF IRON: CPT | Performed by: OBSTETRICS & GYNECOLOGY

## 2023-12-20 PROCEDURE — 82746 ASSAY OF FOLIC ACID SERUM: CPT | Performed by: OBSTETRICS & GYNECOLOGY

## 2023-12-20 PROCEDURE — 36415 COLL VENOUS BLD VENIPUNCTURE: CPT | Mod: PO | Performed by: OBSTETRICS & GYNECOLOGY

## 2023-12-20 PROCEDURE — 84466 ASSAY OF TRANSFERRIN: CPT | Performed by: OBSTETRICS & GYNECOLOGY

## 2023-12-21 LAB
FOLATE SERPL-MCNC: 7.3 NG/ML (ref 4–24)
VIT B12 SERPL-MCNC: 390 PG/ML (ref 210–950)

## 2024-08-07 ENCOUNTER — OFFICE VISIT (OUTPATIENT)
Dept: OBSTETRICS AND GYNECOLOGY | Facility: CLINIC | Age: 24
End: 2024-08-07
Payer: COMMERCIAL

## 2024-08-07 ENCOUNTER — TELEPHONE (OUTPATIENT)
Dept: RADIOLOGY | Facility: HOSPITAL | Age: 24
End: 2024-08-07
Payer: COMMERCIAL

## 2024-08-07 VITALS
HEIGHT: 63 IN | WEIGHT: 189.63 LBS | DIASTOLIC BLOOD PRESSURE: 62 MMHG | BODY MASS INDEX: 33.6 KG/M2 | SYSTOLIC BLOOD PRESSURE: 120 MMHG

## 2024-08-07 DIAGNOSIS — N61.0 MASTITIS: Primary | ICD-10-CM

## 2024-08-07 PROCEDURE — 99214 OFFICE O/P EST MOD 30 MIN: CPT | Mod: S$GLB,,, | Performed by: OBSTETRICS & GYNECOLOGY

## 2024-08-07 PROCEDURE — 99999 PR PBB SHADOW E&M-EST. PATIENT-LVL III: CPT | Mod: PBBFAC,,, | Performed by: OBSTETRICS & GYNECOLOGY

## 2024-08-07 RX ORDER — CLINDAMYCIN HYDROCHLORIDE 300 MG/1
300 CAPSULE ORAL EVERY 8 HOURS
Qty: 9 CAPSULE | Refills: 0 | Status: SHIPPED | OUTPATIENT
Start: 2024-08-07 | End: 2024-08-10

## 2024-08-13 ENCOUNTER — HOSPITAL ENCOUNTER (OUTPATIENT)
Dept: RADIOLOGY | Facility: HOSPITAL | Age: 24
Discharge: HOME | End: 2024-08-13
Attending: OBSTETRICS & GYNECOLOGY
Payer: COMMERCIAL

## 2024-08-13 DIAGNOSIS — N61.0 MASTITIS: ICD-10-CM

## 2024-08-13 PROCEDURE — 76642 ULTRASOUND BREAST LIMITED: CPT | Mod: TC,50,PO

## 2024-08-13 PROCEDURE — 76642 ULTRASOUND BREAST LIMITED: CPT | Mod: 26,50,, | Performed by: RADIOLOGY

## 2024-08-14 DIAGNOSIS — N94.6 DYSMENORRHEA: ICD-10-CM

## 2024-08-14 RX ORDER — NAPROXEN SODIUM 550 MG/1
550 TABLET ORAL 2 TIMES DAILY WITH MEALS
Qty: 60 TABLET | Refills: 2 | Status: SHIPPED | OUTPATIENT
Start: 2024-08-14